# Patient Record
Sex: FEMALE | Race: WHITE | Employment: OTHER | URBAN - NONMETROPOLITAN AREA
[De-identification: names, ages, dates, MRNs, and addresses within clinical notes are randomized per-mention and may not be internally consistent; named-entity substitution may affect disease eponyms.]

---

## 2017-01-18 ENCOUNTER — MED REC SCAN ONLY (OUTPATIENT)
Dept: FAMILY MEDICINE CLINIC | Facility: CLINIC | Age: 37
End: 2017-01-18

## 2017-01-31 ENCOUNTER — PATIENT OUTREACH (OUTPATIENT)
Dept: FAMILY MEDICINE CLINIC | Facility: CLINIC | Age: 37
End: 2017-01-31

## 2017-05-13 RX ORDER — OMEPRAZOLE 40 MG/1
CAPSULE, DELAYED RELEASE ORAL
Qty: 90 CAPSULE | Refills: 0 | Status: SHIPPED | OUTPATIENT
Start: 2017-05-13 | End: 2017-07-22

## 2017-05-13 NOTE — TELEPHONE ENCOUNTER
Last rf 12/31/16 #90x1  Last ov 9/28/16  Last labs 9/30/16 re ck in 3 mo    No future appointments. Letter sent to pt to have fasting labs done.  enoc

## 2017-07-06 RX ORDER — VENLAFAXINE HYDROCHLORIDE 150 MG/1
CAPSULE, EXTENDED RELEASE ORAL
Qty: 360 CAPSULE | Refills: 0 | Status: SHIPPED | OUTPATIENT
Start: 2017-07-06 | End: 2017-12-25

## 2017-07-06 NOTE — TELEPHONE ENCOUNTER
LAST REFILL 10-28-16 #90 W/ 0 REFILLS  LAST OV: 9-28-16      CALLING PATIENT TO VERIFY DOSE  PATIENT TAKES 2 CAPSULES DAILY.      DOES NOT WANT TABLETS

## 2017-07-24 RX ORDER — OMEPRAZOLE 40 MG/1
CAPSULE, DELAYED RELEASE ORAL
Qty: 90 CAPSULE | Refills: 0 | Status: SHIPPED | OUTPATIENT
Start: 2017-07-24 | End: 2018-01-11

## 2017-08-09 ENCOUNTER — LABORATORY ENCOUNTER (OUTPATIENT)
Dept: LAB | Age: 37
End: 2017-08-09
Attending: OTOLARYNGOLOGY
Payer: COMMERCIAL

## 2017-08-09 DIAGNOSIS — Z76.89 ENCOUNTER FOR BIOPSY: Primary | ICD-10-CM

## 2017-08-09 PROCEDURE — 88300 SURGICAL PATH GROSS: CPT

## 2017-10-24 RX ORDER — OMEPRAZOLE 40 MG/1
CAPSULE, DELAYED RELEASE ORAL
Qty: 90 CAPSULE | Refills: 0 | Status: SHIPPED | OUTPATIENT
Start: 2017-10-24 | End: 2018-01-11

## 2017-12-25 DIAGNOSIS — Z00.00 ROUTINE HEALTH MAINTENANCE: Primary | ICD-10-CM

## 2017-12-26 NOTE — TELEPHONE ENCOUNTER
Last OV 9/28/16, last refill 7/6/17. No future appointments pending.  My chart note sent to patient that she is due for an annual wellness exam.

## 2017-12-26 NOTE — TELEPHONE ENCOUNTER
Appointment scheduled for January 3rd for a physical, will you fill the medication? Patient asked if she can do blood work ahead of time, if so what does she need?

## 2017-12-28 RX ORDER — VENLAFAXINE HYDROCHLORIDE 150 MG/1
CAPSULE, EXTENDED RELEASE ORAL
Qty: 360 CAPSULE | Refills: 0 | Status: SHIPPED | OUTPATIENT
Start: 2017-12-28 | End: 2018-06-22

## 2018-01-09 ENCOUNTER — LABORATORY ENCOUNTER (OUTPATIENT)
Dept: LAB | Age: 38
End: 2018-01-09
Attending: INTERNAL MEDICINE
Payer: COMMERCIAL

## 2018-01-09 DIAGNOSIS — Z00.00 ROUTINE HEALTH MAINTENANCE: ICD-10-CM

## 2018-01-09 LAB
ALBUMIN SERPL-MCNC: 3.7 G/DL (ref 3.5–4.8)
ALP LIVER SERPL-CCNC: 99 U/L (ref 37–98)
ALT SERPL-CCNC: 19 U/L (ref 14–54)
AST SERPL-CCNC: 15 U/L (ref 15–41)
BASOPHILS # BLD AUTO: 0.06 X10(3) UL (ref 0–0.1)
BASOPHILS NFR BLD AUTO: 0.6 %
BILIRUB SERPL-MCNC: 0.3 MG/DL (ref 0.1–2)
BUN BLD-MCNC: 19 MG/DL (ref 8–20)
CALCIUM BLD-MCNC: 8.7 MG/DL (ref 8.3–10.3)
CHLORIDE: 104 MMOL/L (ref 101–111)
CHOLEST SMN-MCNC: 222 MG/DL (ref ?–200)
CO2: 27 MMOL/L (ref 22–32)
CREAT BLD-MCNC: 0.86 MG/DL (ref 0.55–1.02)
EOSINOPHIL # BLD AUTO: 0.74 X10(3) UL (ref 0–0.3)
EOSINOPHIL NFR BLD AUTO: 7.5 %
ERYTHROCYTE [DISTWIDTH] IN BLOOD BY AUTOMATED COUNT: 14.3 % (ref 11.5–16)
GLUCOSE BLD-MCNC: 75 MG/DL (ref 70–99)
HCT VFR BLD AUTO: 40.5 % (ref 34–50)
HDLC SERPL-MCNC: 30 MG/DL (ref 45–?)
HDLC SERPL: 7.4 {RATIO} (ref ?–4.44)
HGB BLD-MCNC: 13 G/DL (ref 12–16)
IMMATURE GRANULOCYTE COUNT: 0.04 X10(3) UL (ref 0–1)
IMMATURE GRANULOCYTE RATIO %: 0.4 %
LDLC SERPL DIRECT ASSAY-MCNC: 134 MG/DL (ref ?–100)
LYMPHOCYTES # BLD AUTO: 2.92 X10(3) UL (ref 0.9–4)
LYMPHOCYTES NFR BLD AUTO: 29.6 %
M PROTEIN MFR SERPL ELPH: 7.6 G/DL (ref 6.1–8.3)
MCH RBC QN AUTO: 28.2 PG (ref 27–33.2)
MCHC RBC AUTO-ENTMCNC: 32.1 G/DL (ref 31–37)
MCV RBC AUTO: 87.9 FL (ref 81–100)
MONOCYTES # BLD AUTO: 0.57 X10(3) UL (ref 0.1–0.6)
MONOCYTES NFR BLD AUTO: 5.8 %
NEUTROPHIL ABS PRELIM: 5.53 X10 (3) UL (ref 1.3–6.7)
NEUTROPHILS # BLD AUTO: 5.53 X10(3) UL (ref 1.3–6.7)
NEUTROPHILS NFR BLD AUTO: 56.1 %
NONHDLC SERPL-MCNC: 192 MG/DL (ref ?–130)
PLATELET # BLD AUTO: 430 10(3)UL (ref 150–450)
POTASSIUM SERPL-SCNC: 4.4 MMOL/L (ref 3.6–5.1)
RBC # BLD AUTO: 4.61 X10(6)UL (ref 3.8–5.1)
RED CELL DISTRIBUTION WIDTH-SD: 45.9 FL (ref 35.1–46.3)
SODIUM SERPL-SCNC: 137 MMOL/L (ref 136–144)
TRIGL SERPL-MCNC: 425 MG/DL (ref ?–150)
WBC # BLD AUTO: 9.9 X10(3) UL (ref 4–13)

## 2018-01-09 PROCEDURE — 80061 LIPID PANEL: CPT | Performed by: INTERNAL MEDICINE

## 2018-01-09 PROCEDURE — 83721 ASSAY OF BLOOD LIPOPROTEIN: CPT | Performed by: INTERNAL MEDICINE

## 2018-01-09 PROCEDURE — 85025 COMPLETE CBC W/AUTO DIFF WBC: CPT | Performed by: INTERNAL MEDICINE

## 2018-01-09 PROCEDURE — 80053 COMPREHEN METABOLIC PANEL: CPT | Performed by: INTERNAL MEDICINE

## 2018-01-09 PROCEDURE — 36415 COLL VENOUS BLD VENIPUNCTURE: CPT | Performed by: INTERNAL MEDICINE

## 2018-01-11 NOTE — PROGRESS NOTES
HPI:   Berenice Payne is a 40year old female who presents for a complete physical exam. Symptoms: denies discharge, itching, burning or dysuria, periods are regular, is S/P TANVI, ovaries preserved. Patient complains of obesity.   She has been overwei per week.   Diet: doesn't watch     REVIEW OF SYSTEMS:   GENERAL: feels well otherwise  SKIN: denies any unusual skin lesions  EYES:denies blurred vision or double vision  HEENT: denies nasal congestion, sinus pain or ST  LUNGS: denies shortness of breath w

## 2018-01-29 ENCOUNTER — PATIENT MESSAGE (OUTPATIENT)
Dept: FAMILY MEDICINE CLINIC | Facility: CLINIC | Age: 38
End: 2018-01-29

## 2018-01-29 NOTE — TELEPHONE ENCOUNTER
From: Stephen Urbina  To: Esther Steele MD  Sent: 1/29/2018 3:00 PM CST  Subject: Prescription Question    Hi Dr. Jordan Feldman - I just wanted to give you a two week-melissa update on the vyvanse prescription. So far the mornings have been good.  I haven't don

## 2018-02-08 NOTE — PROGRESS NOTES
Stephen Urbina is a 40year old female. HPI:   Pt has been on Vyvanse for a month for binge eating disorder. She was in [place] the last week so really not paying attention to her diet on vacation. She continues to have trouble around noon.   She distress  SKIN: no rashes,no suspicious lesions  HEENT: atraumatic, normocephalic,ears and throat are clear  NECK: supple,no adenopathy,no bruits  LUNGS: clear to auscultation  CARDIO: RRR without murmur  GI: good BS's,no masses, HSM or tenderness  EXTREMI

## 2018-02-09 PROBLEM — F50.81 BINGE EATING DISORDER: Status: ACTIVE | Noted: 2018-02-09

## 2018-02-09 PROBLEM — F50.819 BINGE EATING DISORDER: Status: ACTIVE | Noted: 2018-02-09

## 2018-03-06 NOTE — PROGRESS NOTES
franklin Mckeon is a 40year old female. HPI:   Still eating too much at night before bed. Working out but no weight loss! I will increase once more.      Current Outpatient Prescriptions:  Lisdexamfetamine Dimesylate 60 MG Oral Cap Take 1 capsule ( clubbing or edema    ASSESSMENT AND PLAN:   Binge eating disorder  (primary encounter diagnosis)  No chest pain or palpitations. She has been able to control her appetite in the AM and noon, but wearing off at night.    No orders of the defined types were

## 2018-03-20 ENCOUNTER — OFFICE VISIT (OUTPATIENT)
Dept: FAMILY MEDICINE CLINIC | Facility: CLINIC | Age: 38
End: 2018-03-20

## 2018-03-20 VITALS
BODY MASS INDEX: 41.54 KG/M2 | SYSTOLIC BLOOD PRESSURE: 130 MMHG | WEIGHT: 231.5 LBS | DIASTOLIC BLOOD PRESSURE: 78 MMHG | OXYGEN SATURATION: 98 % | TEMPERATURE: 99 F | HEIGHT: 62.5 IN | HEART RATE: 111 BPM

## 2018-03-20 DIAGNOSIS — F50.81 BINGE EATING DISORDER: ICD-10-CM
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
## 2018-03-20 PROCEDURE — 99214 OFFICE O/P EST MOD 30 MIN: CPT | Performed by: INTERNAL MEDICINE

## 2018-03-20 RX ORDER — CEPHALEXIN 500 MG/1
500 CAPSULE ORAL 4 TIMES DAILY
Qty: 40 CAPSULE | Refills: 0 | Status: SHIPPED | OUTPATIENT
Start: 2018-03-20 | End: 2018-03-30

## 2018-03-21 NOTE — PROGRESS NOTES
Stephen Urbina is a 40year old female. HPI:   Pt has scratch on the left hand and it is swollen, tender and had purulent discharge. She has no fever or streaking.   She has lost 5 pounds and she has not been exercising  She feels a little racy on °F (37.3 °C) (Temporal)   Ht 62.5\"   Wt 231 lb 8 oz   LMP 03/01/2018 (Approximate)   SpO2 98%   BMI 41.67 kg/m²   GENERAL: well developed, well nourished,in no apparent distress  SKIN: no rashes,no suspicious lesions  HEENT: atraumatic, normocephalic,ears

## 2018-04-02 ENCOUNTER — OFFICE VISIT (OUTPATIENT)
Dept: FAMILY MEDICINE CLINIC | Facility: CLINIC | Age: 38
End: 2018-04-02

## 2018-04-02 VITALS
SYSTOLIC BLOOD PRESSURE: 120 MMHG | HEART RATE: 99 BPM | OXYGEN SATURATION: 98 % | TEMPERATURE: 98 F | WEIGHT: 237.38 LBS | DIASTOLIC BLOOD PRESSURE: 70 MMHG | BODY MASS INDEX: 42.59 KG/M2 | HEIGHT: 62.5 IN

## 2018-04-02 DIAGNOSIS — L84 CALLUS: Primary | ICD-10-CM

## 2018-04-02 DIAGNOSIS — F50.81 BINGE EATING DISORDER: ICD-10-CM

## 2018-04-02 PROCEDURE — 99213 OFFICE O/P EST LOW 20 MIN: CPT | Performed by: INTERNAL MEDICINE

## 2018-04-03 NOTE — PROGRESS NOTES
Reyes Do is a 40year old female. HPI:   Pt has a lump on the left hand in the snuff box where a cat scratched her.   She thought that she might try to take 20 mg bid OF THE VYVANSE BECAUSE SHE IS STILL HAVING TROUBLE around 2-3 pm with unchec diagnosis)  Callus pared, no foreign body and back on 60 mg daily vyvanse. No orders of the defined types were placed in this encounter.       Meds & Refills for this Visit:  Signed Prescriptions Disp Refills    Lisdexamfetamine Dimesylate (VYVANSE) 60 MG

## 2018-04-09 RX ORDER — OMEPRAZOLE 40 MG/1
CAPSULE, DELAYED RELEASE ORAL
Qty: 90 CAPSULE | Refills: 1 | Status: SHIPPED | OUTPATIENT
Start: 2018-04-09 | End: 2018-10-09

## 2018-05-01 NOTE — PROGRESS NOTES
Barron Orozco is a 40year old female. HPI:   Pt has been having great success with concentration, poor for appetite suppression. No deprression, no chest pain and no palpitations. Took a break from working out, but back this week.       Current the defined types were placed in this encounter. Meds & Refills for this Visit:  No prescriptions requested or ordered in this encounter    Imaging & Consults:  None    Follow up as needed.       The patient indicates understanding of these issues and

## 2018-05-14 ENCOUNTER — OFFICE VISIT (OUTPATIENT)
Dept: SURGERY | Facility: CLINIC | Age: 38
End: 2018-05-14

## 2018-05-14 VITALS
BODY MASS INDEX: 42.51 KG/M2 | SYSTOLIC BLOOD PRESSURE: 126 MMHG | HEIGHT: 62 IN | WEIGHT: 231 LBS | RESPIRATION RATE: 18 BRPM | DIASTOLIC BLOOD PRESSURE: 86 MMHG | HEART RATE: 87 BPM | TEMPERATURE: 98 F

## 2018-05-14 DIAGNOSIS — K21.9 GASTROESOPHAGEAL REFLUX DISEASE, ESOPHAGITIS PRESENCE NOT SPECIFIED: Primary | ICD-10-CM

## 2018-05-14 DIAGNOSIS — K44.9 HIATAL HERNIA: ICD-10-CM

## 2018-05-14 DIAGNOSIS — K21.9 GASTROESOPHAGEAL REFLUX DISEASE WITHOUT ESOPHAGITIS: ICD-10-CM

## 2018-05-14 PROCEDURE — 99244 OFF/OP CNSLTJ NEW/EST MOD 40: CPT | Performed by: COLON & RECTAL SURGERY

## 2018-05-14 NOTE — H&P
New Patient Visit Note       Active Problems      1. Gastroesophageal reflux disease, esophagitis presence not specified    2. Gastroesophageal reflux disease without esophagitis    3.  Hiatal hernia        Chief Complaint   Gastroesophageal reflux disease her bed at night. She should absolutely continue on a regular basis her omeprazole. She should not skip any days.     If she is going to violate any of the above listed directions, she should take Tums, 4 tablets, in advance of any known discretions, or Left Groin   []  Prior Incision     Has the patient had the following tests (If yes, please provide an approximate date):  Test Name Yes/No Date   Colonoscopy no    Barium Enema no    CT Abdomen no        Allergies  Dominga Garcia is allergic to allegra-d.     Pas MOUTH EVERY DAY Disp: 90 capsule Rfl: 1   Lisdexamfetamine Dimesylate (VYVANSE) 60 MG Oral Cap Take 1 capsule (60 mg total) by mouth every morning. Disp: 30 capsule Rfl: 0   VENLAFAXINE HCL  MG Oral Capsule SR 24 Hr TAKE 2 CAPSULES BY MOUTH DAILY.  Chantal Pineda and breath sounds normal. No accessory muscle usage. No tachypnea. No respiratory distress. She has no decreased breath sounds. She has no wheezes. She has no rhonchi. She has no rales. She exhibits no mass. Abdominal: Soft.  Normal appearance, normal aor This patient has severe gastroesophageal reflux symptoms. The patient states that she was diagnosed in the past with a hiatal hernia. She had an esophagram done at [facility] on June 17, 2015. It confirms a small hiatal hernia.   It also confirms very slight tilt to her bed at night. She should absolutely continue on a regular basis her omeprazole. She should not skip any days.     If she is going to violate any of the above listed directions, she should take Tums, 4 tablets, in advance of any k

## 2018-05-15 PROBLEM — K44.9 HIATAL HERNIA: Status: ACTIVE | Noted: 2018-05-15

## 2018-05-15 PROBLEM — K21.9 GASTROESOPHAGEAL REFLUX DISEASE WITHOUT ESOPHAGITIS: Status: ACTIVE | Noted: 2018-05-15

## 2018-05-16 NOTE — PATIENT INSTRUCTIONS
I am seeing this patient from consultation by the otolaryngology service, and the primary care service. This patient has severe gastroesophageal reflux symptoms. The patient states that she was diagnosed in the past with a hiatal hernia.   She had an es foods.  She should also elevate the head of her bed on blocks or bricks underneath the bedpost to give a very slight tilt to her bed at night. She should absolutely continue on a regular basis her omeprazole. She should not skip any days.     If she is

## 2018-05-18 ENCOUNTER — HOSPITAL ENCOUNTER (OUTPATIENT)
Dept: GENERAL RADIOLOGY | Facility: HOSPITAL | Age: 38
Discharge: HOME | End: 2018-05-18
Attending: COLON & RECTAL SURGERY
Payer: COMMERCIAL

## 2018-05-18 DIAGNOSIS — K21.9 GASTROESOPHAGEAL REFLUX DISEASE, ESOPHAGITIS PRESENCE NOT SPECIFIED: ICD-10-CM

## 2018-05-18 PROCEDURE — 74246 X-RAY XM UPR GI TRC 2CNTRST: CPT | Performed by: COLON & RECTAL SURGERY

## 2018-06-04 ENCOUNTER — OFFICE VISIT (OUTPATIENT)
Dept: SURGERY | Facility: CLINIC | Age: 38
End: 2018-06-04

## 2018-06-04 VITALS
HEIGHT: 62 IN | WEIGHT: 225 LBS | TEMPERATURE: 99 F | BODY MASS INDEX: 41.41 KG/M2 | HEART RATE: 94 BPM | DIASTOLIC BLOOD PRESSURE: 84 MMHG | SYSTOLIC BLOOD PRESSURE: 152 MMHG

## 2018-06-04 DIAGNOSIS — K21.9 GASTROESOPHAGEAL REFLUX DISEASE WITHOUT ESOPHAGITIS: Primary | ICD-10-CM

## 2018-06-04 DIAGNOSIS — K44.9 HIATAL HERNIA: ICD-10-CM

## 2018-06-04 DIAGNOSIS — F50.81 BINGE EATING DISORDER: ICD-10-CM

## 2018-06-04 PROCEDURE — 99213 OFFICE O/P EST LOW 20 MIN: CPT | Performed by: COLON & RECTAL SURGERY

## 2018-06-04 NOTE — PROGRESS NOTES
Follow Up Visit Note       Active Problems      1. Gastroesophageal reflux disease without esophagitis    2. Hiatal hernia    3.  Binge eating disorder          Chief Complaint   Patient presents with:  Gastro-esophageal Reflux: continued care of Gastric re patient was 30 minutes. Greater than half of our visit was spent in counseling the patient on the above listed diagnoses and treatment options.           PROCEDURE:  XR UGI W/ESOPHOGRAM [facility]) (CPT=74246,09746)     TECHNIQUE:  An air contrast upper g Onset   • Diabetes Father    • Diabetes Sister    • Cancer Other      Social History    Marital status: Single              Spouse name:                       Years of education:                 Number of children:               Social History Main Topics Genitourinary: Negative for difficulty urinating, dysuria, frequency and urgency. Musculoskeletal: Negative for arthralgias and myalgias. Skin: Negative for color change and rash. Neurological: Negative for tremors, syncope and weakness.    Hematolo thinners. She has no significant constipation or diarrhea. She has no significant systemic symptoms.      We have obtained a more recent upper GI and esophagram.  This will determine whether or not she has had progressive advancement of the hiatal fermin

## 2018-06-06 NOTE — PATIENT INSTRUCTIONS
I am seeing this patient in consultation for a hiatal hernia, as well as for gastroesophageal reflux. I evaluated her in the past.  Her presenting history as listed below: This patient has severe gastroesophageal reflux symptoms.      The patient stat name and number. She should definitely mention to him the binge eating disorder and to see if this has any role in his decision-making whether or not to proceed with an endoscopic procedure.     I will await to hear back from him regarding any recommendati

## 2018-06-22 RX ORDER — VENLAFAXINE HYDROCHLORIDE 150 MG/1
CAPSULE, EXTENDED RELEASE ORAL
Qty: 360 CAPSULE | Refills: 0 | Status: SHIPPED | OUTPATIENT
Start: 2018-06-22 | End: 2018-11-19

## 2018-07-26 NOTE — PROGRESS NOTES
Cata Biggs is a 40year old female. HPI:   Pt has been well, she has not lost weight, but she just hired a nutritional .      Current Outpatient Prescriptions:  Lisdexamfetamine Dimesylate (VYVANSE) 60 MG Oral Cap Take 1 capsule (60 mg tota tenderness  EXTREMITIES: no cyanosis, clubbing or edema    ASSESSMENT AND PLAN:   Binge eating disorder  (primary encounter diagnosis)  Gastroesophageal reflux disease without esophagitis  Hiatal hernia  Considering TIPS for GERD and hernia filled PPI, isabelle

## 2018-09-20 ENCOUNTER — TELEPHONE (OUTPATIENT)
Dept: FAMILY MEDICINE CLINIC | Facility: CLINIC | Age: 38
End: 2018-09-20

## 2018-09-20 NOTE — TELEPHONE ENCOUNTER
She said she does have a script and she wants to know if she can get it early tomorrow or Saturday at [facility].

## 2018-09-20 NOTE — TELEPHONE ENCOUNTER
Jared Dumont at [facility] advised patient would like to fill the script early on Saturday 9/22/18 and Dr Rico Youngblood is authorizing this early refill. Jared Dumont states that this is not a problem she can fill Saturday.

## 2018-09-20 NOTE — TELEPHONE ENCOUNTER
Lisdexamfetamine Dimesylate (VYVANSE) 60 MG Oral Cap     PT WILL  TOMORROW. DUE TO FARMING AND HARVESTING, PT IS REQUESTING TO GET HER SCRIPT EARLY.

## 2018-09-24 ENCOUNTER — PATIENT MESSAGE (OUTPATIENT)
Dept: FAMILY MEDICINE CLINIC | Facility: CLINIC | Age: 38
End: 2018-09-24

## 2018-09-24 NOTE — TELEPHONE ENCOUNTER
From: Dayton Later  To: Sridevi Bowers MD  Sent: 9/24/2018 11:07 AM CDT  Subject: Non-Urgent Medical Question    Hi Dr. Trinh Denise!      I've developed a rash where the dial of my apple watch sits on my wrist. I switched arms and same thing happened the

## 2018-10-09 RX ORDER — OMEPRAZOLE 40 MG/1
CAPSULE, DELAYED RELEASE ORAL
Qty: 90 CAPSULE | Refills: 0 | Status: SHIPPED | OUTPATIENT
Start: 2018-10-09 | End: 2019-04-15

## 2018-10-15 ENCOUNTER — TELEPHONE (OUTPATIENT)
Dept: FAMILY MEDICINE CLINIC | Facility: CLINIC | Age: 38
End: 2018-10-15

## 2018-10-15 NOTE — TELEPHONE ENCOUNTER
Last Ov 7/26/18, last refill 7/26/18 for 3 months. Dr Jyoti Snell had written another script on 9/20/18 but the patient did not need it so it was destroyed. She last picked up a script for Vyvanse on 9/22/18 from [place].

## 2018-10-23 ENCOUNTER — OFFICE VISIT (OUTPATIENT)
Dept: FAMILY MEDICINE CLINIC | Facility: CLINIC | Age: 38
End: 2018-10-23
Payer: COMMERCIAL

## 2018-10-23 VITALS
WEIGHT: 244 LBS | HEIGHT: 62 IN | DIASTOLIC BLOOD PRESSURE: 60 MMHG | BODY MASS INDEX: 44.9 KG/M2 | OXYGEN SATURATION: 97 % | HEART RATE: 90 BPM | SYSTOLIC BLOOD PRESSURE: 120 MMHG | TEMPERATURE: 98 F

## 2018-10-23 DIAGNOSIS — M75.41 IMPINGEMENT SYNDROME OF RIGHT SHOULDER: Primary | ICD-10-CM

## 2018-10-23 PROCEDURE — 99213 OFFICE O/P EST LOW 20 MIN: CPT | Performed by: FAMILY MEDICINE

## 2018-10-23 RX ORDER — TRAMADOL HYDROCHLORIDE 50 MG/1
50 TABLET ORAL NIGHTLY PRN
Qty: 10 TABLET | Refills: 0 | Status: SHIPPED | OUTPATIENT
Start: 2018-10-23 | End: 2018-11-19

## 2018-10-23 NOTE — PATIENT INSTRUCTIONS
Patient referred for physical therapy  Discussed proper posture and scapular stabilization  Avoid overhead activities  May use ibuprofen up to 800 mg 3 times daily, take with food, avoid taking immediately before bedtime, allow at least 2-3 hours before la

## 2018-10-23 NOTE — PROGRESS NOTES
HPI:    Patient ID: Cata Biggs is a 41year old female. Patient presents with:  Arm Pain: started one month ago. . right arm. .     C/o right shoulder pain x 1 month, getting worse  No known injury, pt had been doing lots of wt lifitng  otc nsa and 2+ on the left side. Bicep reflexes are 2+ on the right side and 2+ on the left side. Brachioradialis reflexes are 2+ on the right side and 2+ on the left side. Skin: Skin is warm and dry. Psychiatric: She has a normal mood and affect.

## 2018-10-29 RX ORDER — OMEPRAZOLE 40 MG/1
CAPSULE, DELAYED RELEASE ORAL
Qty: 90 CAPSULE | Refills: 0 | OUTPATIENT
Start: 2018-10-29

## 2018-10-31 ENCOUNTER — APPOINTMENT (OUTPATIENT)
Dept: PHYSICAL THERAPY | Age: 38
End: 2018-10-31
Attending: FAMILY MEDICINE
Payer: COMMERCIAL

## 2018-11-05 ENCOUNTER — OFFICE VISIT (OUTPATIENT)
Dept: PHYSICAL THERAPY | Age: 38
End: 2018-11-05
Attending: FAMILY MEDICINE
Payer: COMMERCIAL

## 2018-11-05 DIAGNOSIS — M75.41 IMPINGEMENT SYNDROME OF RIGHT SHOULDER: ICD-10-CM

## 2018-11-05 PROCEDURE — 97110 THERAPEUTIC EXERCISES: CPT

## 2018-11-05 PROCEDURE — 97161 PT EVAL LOW COMPLEX 20 MIN: CPT

## 2018-11-05 NOTE — PROGRESS NOTES
UPPER EXTREMITY EVALUATION:   Referring Physician: Dr. Kerry Gamble  Diagnosis: R shoulder impingement     Date of Service: 11/5/2018     PATIENT SUMMARY   Selam Alexandre is a 41year old y/o female who presents to therapy today with complaints of R sh 5/5  IR: R 5/5; L 5/5 Rhomboids: R4/5, L 4/5  Mid trap: R 4/5; L 4/5   Lats: R 5/5, L 5/5  Low trap: R 4/5; L 4/5     Special tests:   Moses-Brendan Impingement Sign: R +, L -  Painful Arc Sign: R +, L -  Drop Arm Sign: R -, L -  Dropping Sign at 90 Deg include: Manual Therapy; Therapeutic Exercises;  Neuromuscular Re-education; Pt education; Home exercise program instruction    Education or treatment limitation: None  Rehab Potential:good    FOTO: 53/100        Patient/Family/Caregiver was advised of thes

## 2018-11-09 ENCOUNTER — OFFICE VISIT (OUTPATIENT)
Dept: PHYSICAL THERAPY | Age: 38
End: 2018-11-09
Attending: FAMILY MEDICINE
Payer: COMMERCIAL

## 2018-11-09 DIAGNOSIS — M75.41 IMPINGEMENT SYNDROME OF RIGHT SHOULDER: ICD-10-CM

## 2018-11-09 PROCEDURE — 97110 THERAPEUTIC EXERCISES: CPT

## 2018-11-09 PROCEDURE — 97140 MANUAL THERAPY 1/> REGIONS: CPT

## 2018-11-12 ENCOUNTER — TELEPHONE (OUTPATIENT)
Dept: PHYSICAL THERAPY | Age: 38
End: 2018-11-12

## 2018-11-12 ENCOUNTER — APPOINTMENT (OUTPATIENT)
Dept: PHYSICAL THERAPY | Age: 38
End: 2018-11-12
Attending: FAMILY MEDICINE
Payer: COMMERCIAL

## 2018-11-15 ENCOUNTER — OFFICE VISIT (OUTPATIENT)
Dept: PHYSICAL THERAPY | Age: 38
End: 2018-11-15
Attending: INTERNAL MEDICINE
Payer: COMMERCIAL

## 2018-11-15 PROCEDURE — 97110 THERAPEUTIC EXERCISES: CPT

## 2018-11-15 PROCEDURE — 97140 MANUAL THERAPY 1/> REGIONS: CPT

## 2018-11-15 NOTE — PROGRESS NOTES
Dx: R shoulder impingement         Authorized # of Visits:  8         Next MD visit: none scheduled  Fall Risk: standard         Precautions: n/a             Subjective: Shoulder is feeling pretty good, seems like it is moving better.     Objective:   AROM: pec-3'  Post capsule 10\"x10        Side ER 2# 3x10 Side ER 2# 3x10        Prone  Row 3# 3x10  Hor abd 1# 3x10  Ext 2# 3x10 Prone  Row 3# 3x10  Hor abd 1# 3x10  Ext 2# 3x10        tband  ER red 3x10  IR grn 3x10 tband  ER red 3x10  IR grn 3x10  Row grn 3

## 2018-11-16 ENCOUNTER — OFFICE VISIT (OUTPATIENT)
Dept: PHYSICAL THERAPY | Age: 38
End: 2018-11-16
Attending: FAMILY MEDICINE
Payer: COMMERCIAL

## 2018-11-16 DIAGNOSIS — M75.41 IMPINGEMENT SYNDROME OF RIGHT SHOULDER: ICD-10-CM

## 2018-11-16 PROCEDURE — 97140 MANUAL THERAPY 1/> REGIONS: CPT

## 2018-11-16 PROCEDURE — 97110 THERAPEUTIC EXERCISES: CPT

## 2018-11-19 ENCOUNTER — OFFICE VISIT (OUTPATIENT)
Dept: PHYSICAL THERAPY | Age: 38
End: 2018-11-19
Attending: FAMILY MEDICINE
Payer: COMMERCIAL

## 2018-11-19 DIAGNOSIS — M75.41 IMPINGEMENT SYNDROME OF RIGHT SHOULDER: ICD-10-CM

## 2018-11-19 PROCEDURE — 97110 THERAPEUTIC EXERCISES: CPT

## 2018-11-19 PROCEDURE — 97140 MANUAL THERAPY 1/> REGIONS: CPT

## 2018-11-19 NOTE — PROGRESS NOTES
Dx: R shoulder impingement         Authorized # of Visits:  8         Next MD visit: none scheduled  Fall Risk: standard         Precautions: n/a             Subjective:Muscles soreness, DOMS. Shoulder moving better.     Objective:   AROM:   Shoulder    Fle inf/post Joint mobilization-5' grade 3 inf/post Joint mobilization-5' grade 3 inf/post Joint mobilization-5' grade 3 inf/post      Stretching   pec-3'  Post capsule 10\"x10 Stretching   pec-3'  Post capsule 10\"x10 Stretching   pec-3'  Post capsule 10\"x10

## 2018-11-19 NOTE — PROGRESS NOTES
Moreno Oneal is a 41year old female. HPI:   Pt has been well. Working out and watching intake. She has no chest pain or palpitations.      Current Outpatient Medications:  Venlafaxine HCl  MG Oral Capsule SR 24 Hr Take 2 capsules (300 mg to lesions  HEENT: atraumatic, normocephalic,ears and throat are clear  NECK: supple,no adenopathy,no bruits  LUNGS: clear to auscultation  CARDIO: RRR without murmur  GI: good BS's,no masses, HSM or tenderness  EXTREMITIES: no cyanosis, clubbing or edema

## 2018-11-21 ENCOUNTER — APPOINTMENT (OUTPATIENT)
Dept: PHYSICAL THERAPY | Age: 38
End: 2018-11-21
Attending: FAMILY MEDICINE
Payer: COMMERCIAL

## 2018-11-27 ENCOUNTER — APPOINTMENT (OUTPATIENT)
Dept: PHYSICAL THERAPY | Age: 38
End: 2018-11-27
Attending: FAMILY MEDICINE
Payer: COMMERCIAL

## 2018-12-12 ENCOUNTER — PATIENT MESSAGE (OUTPATIENT)
Dept: FAMILY MEDICINE CLINIC | Facility: CLINIC | Age: 38
End: 2018-12-12

## 2018-12-12 NOTE — TELEPHONE ENCOUNTER
From: Moreno Oneal  To: Kinjal Roberts MD  Sent: 12/12/2018 1:29 PM CST  Subject: Prescription Question    Hi Dr. Michael Reed. I am a bit of an idiot and managed to accidentally recycle my Vyvanse prescription for this month and next month.  I placed the

## 2018-12-12 NOTE — TELEPHONE ENCOUNTER
Last filled 11-19-18 according to med reconciliation. New Rx's pended (with dates as previously written).

## 2018-12-17 ENCOUNTER — PATIENT MESSAGE (OUTPATIENT)
Dept: FAMILY MEDICINE CLINIC | Facility: CLINIC | Age: 38
End: 2018-12-17

## 2018-12-17 NOTE — TELEPHONE ENCOUNTER
From: Cherri Rucker  To: Michael Magallanes MD  Sent: 12/17/2018 1:14 PM CST  Subject: Prescription Question    Hi dr sanchez - question regarding the vyvanse prescription for the 19th of this month - would it be possible to get it filled tomorrow, the 1

## 2018-12-18 ENCOUNTER — TELEPHONE (OUTPATIENT)
Dept: FAMILY MEDICINE CLINIC | Facility: CLINIC | Age: 38
End: 2018-12-18

## 2019-02-02 ENCOUNTER — OFFICE VISIT (OUTPATIENT)
Dept: FAMILY MEDICINE CLINIC | Facility: CLINIC | Age: 39
End: 2019-02-02
Payer: COMMERCIAL

## 2019-02-02 VITALS
OXYGEN SATURATION: 98 % | TEMPERATURE: 98 F | HEART RATE: 100 BPM | RESPIRATION RATE: 18 BRPM | SYSTOLIC BLOOD PRESSURE: 120 MMHG | DIASTOLIC BLOOD PRESSURE: 68 MMHG

## 2019-02-02 DIAGNOSIS — J40 BRONCHITIS: Primary | ICD-10-CM

## 2019-02-02 PROCEDURE — 99213 OFFICE O/P EST LOW 20 MIN: CPT | Performed by: NURSE PRACTITIONER

## 2019-02-02 RX ORDER — PREDNISONE 20 MG/1
20 TABLET ORAL DAILY
Qty: 5 TABLET | Refills: 0 | Status: SHIPPED | OUTPATIENT
Start: 2019-02-02 | End: 2019-02-07

## 2019-02-02 RX ORDER — ALBUTEROL SULFATE 90 UG/1
2 AEROSOL, METERED RESPIRATORY (INHALATION) EVERY 4 HOURS PRN
Qty: 1 INHALER | Refills: 1 | Status: SHIPPED | OUTPATIENT
Start: 2019-02-02 | End: 2019-05-06 | Stop reason: ALTCHOICE

## 2019-02-02 NOTE — PATIENT INSTRUCTIONS
What Is Acute Bronchitis? Acute bronchitis is when the airways in your lungs (bronchial tubes) become red and swollen (inflamed). It is usually caused by a viral infection. But it can also occur because of a bacteria or allergen.  Symptoms include a coug · A chest X-ray. This is done if your healthcare provider thinks you have pneumonia. · Tests to check for an underlying condition. Other tests may be done to check for things such as allergies, asthma, or COPD (chronic obstructive pulmonary disease).  You · Take the medicines as directed. For instance, some medicines should be taken with food. · Ask about side effects. Ask your provider or pharmacist what side effects are common, and what to do about them.   Follow-up care  You should see your provider danisha

## 2019-02-02 NOTE — PROGRESS NOTES
CHIEF COMPLAINT:   Patient presents with:  Cough: congestion, sore throat, HA x 4 days         HPI:   Edwin Valadez is a 41year old female who presents for cough for  4  days. Cough started gradually and is described as constant.  Patient denies h /68 (BP Location: Left arm, Patient Position: Sitting, Cuff Size: adult)   Pulse 100   Temp 98.4 °F (36.9 °C) (Oral)   Resp 18   LMP 01/15/2019   SpO2 98%   GENERAL: well developed, well nourished,in no apparent distress  SKIN: no rashes, no suspicio Acute bronchitis is when the airways in your lungs (bronchial tubes) become red and swollen (inflamed). It is usually caused by a viral infection. But it can also occur because of a bacteria or allergen.  Symptoms include a cough that produces yellow or gre · A chest X-ray. This is done if your healthcare provider thinks you have pneumonia. · Tests to check for an underlying condition. Other tests may be done to check for things such as allergies, asthma, or COPD (chronic obstructive pulmonary disease).  You · Take the medicines as directed. For instance, some medicines should be taken with food. · Ask about side effects. Ask your provider or pharmacist what side effects are common, and what to do about them.   Follow-up care  You should see your provider danisha

## 2019-02-14 NOTE — PROGRESS NOTES
Barron Orozco is a 41year old female. HPI:   Pt has been eating due to loss of a 24 y/o family friend. He succeeded in committing suicide. She stopped her medications for a short time and has not been exercising this winter.   I think counseling shortness of breath with exertion  CARDIOVASCULAR: denies chest pain on exertion  GI: denies abdominal pain and denies heartburn  NEURO: denies headaches    EXAM:   /74 (BP Location: Left arm, Patient Position: Sitting, Cuff Size: large)   Pulse 111

## 2019-03-14 ENCOUNTER — TELEPHONE (OUTPATIENT)
Dept: FAMILY MEDICINE CLINIC | Facility: CLINIC | Age: 39
End: 2019-03-14

## 2019-03-14 NOTE — TELEPHONE ENCOUNTER
Lisdexamfetamine Dimesylate (VYVANSE) 50 MG Oral Cap   Pt. Asking if Dr. Dania Conklin will call the pharmacy [facility] so she can get it filled one day early. Pt. Has the script.

## 2019-03-15 ENCOUNTER — TELEPHONE (OUTPATIENT)
Dept: FAMILY MEDICINE CLINIC | Facility: CLINIC | Age: 39
End: 2019-03-15

## 2019-03-15 NOTE — TELEPHONE ENCOUNTER
returned phone call to pharmacy spoke to Sonu Delaney, he needed to know if okay to fill script 1 day early. Per telephone note dated yesterday TG okayed early refill.

## 2019-04-08 ENCOUNTER — TELEPHONE (OUTPATIENT)
Dept: FAMILY MEDICINE CLINIC | Facility: CLINIC | Age: 39
End: 2019-04-08

## 2019-04-08 NOTE — TELEPHONE ENCOUNTER
ACCIDENTALLY TORE UP SCRIPT FOR VYVANSE, SHE THOUGHT IT WAS CREDIT CARD CHECKS. ASKING IF SHE CAN GET A REPLACEMENT? SHE CAN BRING IN TORN SCRIPT.

## 2019-04-12 ENCOUNTER — PATIENT MESSAGE (OUTPATIENT)
Dept: FAMILY MEDICINE CLINIC | Facility: CLINIC | Age: 39
End: 2019-04-12

## 2019-04-12 NOTE — TELEPHONE ENCOUNTER
From: Gretta Koyanagi  To: Jonathan Guido MD  Sent: 4/12/2019 9:51 AM CDT  Subject: Prescription Question    They told me that I can only fill it on the start date unless they are told otherwise. I use [facility]. Could you call them please?

## 2019-04-12 NOTE — TELEPHONE ENCOUNTER
From: Tamy Tiwari  To: Viviana Patton MD  Sent: 4/12/2019 9:35 AM CDT  Subject: Prescription Question    Hi dr sanchez/Ev -     Would I be able to get my prescription filled two days early and get it tomorrow?  The prescription is still at your o

## 2019-04-15 RX ORDER — OMEPRAZOLE 40 MG/1
CAPSULE, DELAYED RELEASE ORAL
Qty: 90 CAPSULE | Refills: 0 | Status: SHIPPED | OUTPATIENT
Start: 2019-04-15 | End: 2020-01-17

## 2019-05-06 ENCOUNTER — OFFICE VISIT (OUTPATIENT)
Dept: FAMILY MEDICINE CLINIC | Facility: CLINIC | Age: 39
End: 2019-05-06
Payer: COMMERCIAL

## 2019-05-06 VITALS
SYSTOLIC BLOOD PRESSURE: 118 MMHG | BODY MASS INDEX: 45.45 KG/M2 | HEIGHT: 62 IN | TEMPERATURE: 98 F | OXYGEN SATURATION: 99 % | HEART RATE: 97 BPM | DIASTOLIC BLOOD PRESSURE: 70 MMHG | WEIGHT: 247 LBS

## 2019-05-06 DIAGNOSIS — Z00.00 WELL ADULT EXAM: Primary | ICD-10-CM

## 2019-05-06 PROCEDURE — 99395 PREV VISIT EST AGE 18-39: CPT | Performed by: INTERNAL MEDICINE

## 2019-05-06 RX ORDER — VENLAFAXINE HYDROCHLORIDE 150 MG/1
300 CAPSULE, EXTENDED RELEASE ORAL DAILY
Refills: 3 | COMMUNITY
Start: 2019-03-15 | End: 2019-06-03

## 2019-05-06 NOTE — PROGRESS NOTES
HPI:   Eunice Pimentel is a 41year old female who presents for a complete physical exam. Symptoms: denies discharge, itching, burning or dysuria, periods are regular. Patient complains of nothing. She has been working out and recording all foods. Abdominal hernia    • Anxiety    • Depression    • Hiatal hernia    • History of depression    • Indigestion    • Vomiting    • Wears glasses       Past Surgical History:   Procedure Laterality Date   • BREAST BIOPSY Right     bengin   • OTHER SURGICAL HIS or tenderness  : deferred   RECTAL: Deferred  MUSCULOSKELETAL: back is not tender,FROM of the back  EXTREMITIES: no cyanosis, clubbing or edema  NEURO: Oriented times three,cranial nerves are intact,motor and sensory are grossly intact    ASSESSMENT AND

## 2019-05-29 ENCOUNTER — PATIENT MESSAGE (OUTPATIENT)
Dept: FAMILY MEDICINE CLINIC | Facility: CLINIC | Age: 39
End: 2019-05-29

## 2019-05-29 NOTE — TELEPHONE ENCOUNTER
From: Precious Dominguez  To: Taylor Crawford MD  Sent: 5/29/2019 1:44 PM CDT  Subject: Prescription Question    Hi Dr. Rand Klein! I have been having a lot of trouble lately (2 weeks or so) regarding my depression.  It feels as though my medication is no long

## 2019-06-03 NOTE — PROGRESS NOTES
Daja Quiros is a 41year old female. HPI:   Pt has anxiety and low motivation. She feels like she did when she started the Effexor 15 years ago at 25.   This month has been really hard, she had a friend pass away this year, but this is not the p and denies heartburn  NEURO: denies headaches    EXAM:   /80 (BP Location: Right arm, Patient Position: Sitting, Cuff Size: large)   Pulse 91   Temp 97.9 °F (36.6 °C) (Temporal)   Ht 62\"   Wt 251 lb   SpO2 98%   BMI 45.91 kg/m²   GENERAL: well devel

## 2019-06-05 ENCOUNTER — PATIENT OUTREACH (OUTPATIENT)
Dept: FAMILY MEDICINE CLINIC | Facility: CLINIC | Age: 39
End: 2019-06-05

## 2019-06-11 ENCOUNTER — PATIENT MESSAGE (OUTPATIENT)
Dept: FAMILY MEDICINE CLINIC | Facility: CLINIC | Age: 39
End: 2019-06-11

## 2019-06-12 RX ORDER — VENLAFAXINE 75 MG/1
75 TABLET ORAL 2 TIMES DAILY
Qty: 180 TABLET | Refills: 0 | Status: SHIPPED | OUTPATIENT
Start: 2019-06-12 | End: 2019-07-31 | Stop reason: ALTCHOICE

## 2019-06-12 NOTE — TELEPHONE ENCOUNTER
From: Cata Biggs  To: Triny Dykes MD  Sent: 6/11/2019 10:14 PM CDT  Subject: Prescription Question    Hi dr sanchez. Up until today I was doing great. I feel like today the withdrawal took thinks up a notch.  Super dizzy, really nauseous, extrem

## 2019-07-31 NOTE — PROGRESS NOTES
Dede Silverman is a 41year old female. HPI:   Pt has been on ketamine infusions for the last 6 months through [facility], Dr Kyle Monk. She was doing infusions in [facility].  Has helped tremendously with depression, since she has been suff GENERAL: well developed, well nourished,in no apparent distress  SKIN: no rashes,no suspicious lesions  HEENT: atraumatic, normocephalic,ears and throat are clear  NECK: supple,no adenopathy,no bruits  LUNGS: clear to auscultation  CARDIO: RRR without mu

## 2019-08-22 ENCOUNTER — TELEPHONE (OUTPATIENT)
Dept: FAMILY MEDICINE CLINIC | Facility: CLINIC | Age: 39
End: 2019-08-22

## 2019-09-18 ENCOUNTER — PATIENT MESSAGE (OUTPATIENT)
Dept: FAMILY MEDICINE CLINIC | Facility: CLINIC | Age: 39
End: 2019-09-18

## 2019-09-18 NOTE — TELEPHONE ENCOUNTER
From: Alm Rinne  To: Jim Whitt MD  Sent: 9/18/2019 1:38 PM CDT  Subject: Prescription Question    Hi Dr. Etta Waddell! Long story short - my prescription papers got thrown out in a dumpster during my house remodeling.  I am not dumpster diving

## 2019-10-10 ENCOUNTER — PATIENT MESSAGE (OUTPATIENT)
Dept: FAMILY MEDICINE CLINIC | Facility: CLINIC | Age: 39
End: 2019-10-10

## 2019-10-10 NOTE — TELEPHONE ENCOUNTER
From: Dennie Rooks  To: Horacio Leon MD  Sent: 10/10/2019 1:45 PM CDT  Subject: Prescription Question    Hi Dr. Delaney Alvarenga! I hope you're doing well. I would like to switch back to the 60mg Vyvanse.  The 40mg just doesn't seem to have the same effect

## 2019-10-23 ENCOUNTER — LAB ENCOUNTER (OUTPATIENT)
Dept: LAB | Age: 39
End: 2019-10-23
Attending: INTERNAL MEDICINE
Payer: COMMERCIAL

## 2019-10-23 DIAGNOSIS — Z00.00 WELL ADULT EXAM: ICD-10-CM

## 2019-10-23 PROCEDURE — 80061 LIPID PANEL: CPT | Performed by: INTERNAL MEDICINE

## 2019-10-23 PROCEDURE — 85025 COMPLETE CBC W/AUTO DIFF WBC: CPT | Performed by: INTERNAL MEDICINE

## 2019-10-23 PROCEDURE — 80053 COMPREHEN METABOLIC PANEL: CPT | Performed by: INTERNAL MEDICINE

## 2019-10-23 PROCEDURE — 83721 ASSAY OF BLOOD LIPOPROTEIN: CPT | Performed by: INTERNAL MEDICINE

## 2019-10-23 PROCEDURE — 36415 COLL VENOUS BLD VENIPUNCTURE: CPT | Performed by: INTERNAL MEDICINE

## 2019-10-29 NOTE — PROGRESS NOTES
Waleska Jack is a 46year old female. HPI:   Pt thinks her appetite gets ravenous when her vyvanse wears off. She is gaining again and doing ketamine infusions about once a month. She is very satisfied with her sleep and moods seems fine.   She ha adenopathy,no bruits  LUNGS: clear to auscultation  CARDIO: RRR without murmur  GI: good BS's,no masses, HSM or tenderness  EXTREMITIES: no cyanosis, clubbing or edema    ASSESSMENT AND PLAN:     Binge eating disorder  (primary encounter diagnosis)  Adult

## 2019-11-19 RX ORDER — VENLAFAXINE HYDROCHLORIDE 150 MG/1
CAPSULE, EXTENDED RELEASE ORAL
Qty: 180 CAPSULE | Refills: 0 | Status: SHIPPED | OUTPATIENT
Start: 2019-11-19 | End: 2019-12-02 | Stop reason: ALTCHOICE

## 2019-12-02 ENCOUNTER — OFFICE VISIT (OUTPATIENT)
Dept: FAMILY MEDICINE CLINIC | Facility: CLINIC | Age: 39
End: 2019-12-02
Payer: COMMERCIAL

## 2019-12-02 ENCOUNTER — TELEPHONE (OUTPATIENT)
Dept: FAMILY MEDICINE CLINIC | Facility: CLINIC | Age: 39
End: 2019-12-02

## 2019-12-02 VITALS
TEMPERATURE: 99 F | HEIGHT: 62 IN | HEART RATE: 106 BPM | WEIGHT: 275 LBS | DIASTOLIC BLOOD PRESSURE: 80 MMHG | RESPIRATION RATE: 18 BRPM | BODY MASS INDEX: 50.61 KG/M2 | SYSTOLIC BLOOD PRESSURE: 130 MMHG

## 2019-12-02 DIAGNOSIS — J20.9 BRONCHITIS WITH BRONCHOSPASM: Primary | ICD-10-CM

## 2019-12-02 PROCEDURE — 99214 OFFICE O/P EST MOD 30 MIN: CPT | Performed by: INTERNAL MEDICINE

## 2019-12-02 RX ORDER — PREDNISONE 20 MG/1
TABLET ORAL
Qty: 18 TABLET | Refills: 0 | Status: SHIPPED | OUTPATIENT
Start: 2019-12-02 | End: 2020-02-13 | Stop reason: ALTCHOICE

## 2019-12-02 RX ORDER — ALBUTEROL SULFATE 90 UG/1
1 AEROSOL, METERED RESPIRATORY (INHALATION) EVERY 6 HOURS PRN
Qty: 1 INHALER | Refills: 0 | Status: SHIPPED | OUTPATIENT
Start: 2019-12-02 | End: 2020-01-01

## 2019-12-02 RX ORDER — PREDNISONE 20 MG/1
TABLET ORAL
Qty: 18 TABLET | Refills: 0 | Status: SHIPPED | OUTPATIENT
Start: 2019-12-02 | End: 2019-12-02

## 2019-12-02 NOTE — TELEPHONE ENCOUNTER
Form completed and sent to Physician's Office electronically via Nurse Pool for review and signature.     Unable to release form until signed authorization received.     Her med's are not at [facility] pl call them in.

## 2019-12-02 NOTE — PROGRESS NOTES
HPI:   Daja Quiros is a 46year old female who presents for upper respiratory symptoms for  4  days. Patient reports congestion, dry cough, cough is keeping pt up at night, chest pain from coughing, wheezing.     Current Outpatient Medications   M auscultation  CARDIO: RRR without murmur  GI: good BS's,no masses, HSM or tenderness    ASSESSMENT AND PLAN:   Gretta Koyanagi is a 46year old female who presents with Bronchitis with Bronchospasm.  PLAN: OTC decongestants, throat lozenges and tyleno

## 2019-12-30 ENCOUNTER — PATIENT MESSAGE (OUTPATIENT)
Dept: FAMILY MEDICINE CLINIC | Facility: CLINIC | Age: 39
End: 2019-12-30

## 2019-12-30 ENCOUNTER — OFFICE VISIT (OUTPATIENT)
Dept: FAMILY MEDICINE CLINIC | Facility: CLINIC | Age: 39
End: 2019-12-30
Payer: COMMERCIAL

## 2019-12-30 VITALS
WEIGHT: 281 LBS | DIASTOLIC BLOOD PRESSURE: 70 MMHG | BODY MASS INDEX: 51 KG/M2 | SYSTOLIC BLOOD PRESSURE: 120 MMHG | TEMPERATURE: 98 F | RESPIRATION RATE: 16 BRPM | HEART RATE: 94 BPM

## 2019-12-30 DIAGNOSIS — L05.91 PILONIDAL CYST: Primary | ICD-10-CM

## 2019-12-30 PROCEDURE — 99214 OFFICE O/P EST MOD 30 MIN: CPT | Performed by: INTERNAL MEDICINE

## 2019-12-30 RX ORDER — SULFAMETHOXAZOLE AND TRIMETHOPRIM 800; 160 MG/1; MG/1
1 TABLET ORAL EVERY 12 HOURS
COMMUNITY
Start: 2019-12-23 | End: 2019-12-30

## 2019-12-30 NOTE — TELEPHONE ENCOUNTER
From: Karl Lucas  To: Carmen London MD  Sent: 12/30/2019 7:58 AM CST  Subject: Prescription Question    Hi Dr Jessy Arteaga! [facility] be able [facility] and let them fill my vyvanse presecription today?  It's due the 1st, but i am leaving early t

## 2019-12-31 NOTE — PROGRESS NOTES
Cherri Rucker is a 46year old female. HPI:   Pt has been on Keflex after abscess drainage in the ER 12/23/2019 of a pilonidal cyst.  It was packed and she returned 12/24 for removal of packing. She is feeling better, no pain, no drainage.   Here with exertion  CARDIOVASCULAR: denies chest pain on exertion  GI: denies abdominal pain and denies heartburn  NEURO: denies headaches    EXAM:   /70 (BP Location: Left arm, Patient Position: Sitting, Cuff Size: large)   Pulse 94   Temp 98 °F (36.7 °C

## 2020-01-09 ENCOUNTER — TELEPHONE (OUTPATIENT)
Dept: FAMILY MEDICINE CLINIC | Facility: CLINIC | Age: 40
End: 2020-01-09

## 2020-01-09 ENCOUNTER — OFFICE VISIT (OUTPATIENT)
Dept: FAMILY MEDICINE CLINIC | Facility: CLINIC | Age: 40
End: 2020-01-09
Payer: COMMERCIAL

## 2020-01-09 VITALS
SYSTOLIC BLOOD PRESSURE: 124 MMHG | RESPIRATION RATE: 18 BRPM | WEIGHT: 273 LBS | BODY MASS INDEX: 50 KG/M2 | TEMPERATURE: 98 F | HEART RATE: 100 BPM | DIASTOLIC BLOOD PRESSURE: 70 MMHG

## 2020-01-09 DIAGNOSIS — L73.9 FOLLICULITIS: Primary | ICD-10-CM

## 2020-01-09 PROCEDURE — 99214 OFFICE O/P EST MOD 30 MIN: CPT | Performed by: INTERNAL MEDICINE

## 2020-01-09 RX ORDER — AMOXICILLIN 400 MG/5ML
400 POWDER, FOR SUSPENSION ORAL 2 TIMES DAILY
Qty: 100 ML | Refills: 0 | Status: SHIPPED | OUTPATIENT
Start: 2020-01-09 | End: 2020-01-09 | Stop reason: DRUGHIGH

## 2020-01-09 RX ORDER — AMOXICILLIN 500 MG/1
500 CAPSULE ORAL 3 TIMES DAILY
Qty: 30 CAPSULE | Refills: 0 | Status: SHIPPED | OUTPATIENT
Start: 2020-01-09 | End: 2020-01-19

## 2020-01-09 NOTE — PROGRESS NOTES
Corin Rodgers is a 46year old female. HPI:   Pt has   Current Outpatient Medications   Medication Sig Dispense Refill   • Lisdexamfetamine Dimesylate (VYVANSE) 40 MG Oral Cap Take 1 capsule (40 mg total) by mouth daily.  30 capsule 0   • [START ON BS's,no masses, HSM or tenderness  EXTREMITIES: no cyanosis, clubbing or edema    ASSESSMENT AND PLAN:     No diagnosis found. No orders of the defined types were placed in this encounter.       Meds & Refills for this Visit:  Requested Prescriptions

## 2020-01-09 NOTE — TELEPHONE ENCOUNTER
[facility] asked if they should fill the Amox capsules or liquid. Advised capsules. W.THIAGO.  Dr Cristiana Reyes RN

## 2020-01-09 NOTE — PROGRESS NOTES
Cata Biggs is a 46year old female. HPI:   Pt has a rash on the face x 3 days. She was on bactrim for a pilonidal cyst recently and before that in early December steroids. She feels as if this kind of came out of the blue and spread quickly. (36.6 °C) (Temporal)   Resp 18   Wt 273 lb (123.8 kg)   BMI 49.93 kg/m²   GENERAL: well developed, well nourished,in no apparent distress  SKIN: small pustular rash on face  HEENT: atraumatic, normocephalic,ears and throat are clear  NECK: supple,no adenop

## 2020-01-17 RX ORDER — OMEPRAZOLE 40 MG/1
CAPSULE, DELAYED RELEASE ORAL
Qty: 90 CAPSULE | Refills: 0 | Status: SHIPPED | OUTPATIENT
Start: 2020-01-17 | End: 2020-04-20

## 2020-01-29 ENCOUNTER — TELEPHONE (OUTPATIENT)
Dept: FAMILY MEDICINE CLINIC | Facility: CLINIC | Age: 40
End: 2020-01-29

## 2020-02-06 ENCOUNTER — MED REC SCAN ONLY (OUTPATIENT)
Dept: FAMILY MEDICINE CLINIC | Facility: CLINIC | Age: 40
End: 2020-02-06

## 2020-02-13 NOTE — PROGRESS NOTES
Aiyana Urbina is a 46year old female. HPI:   Pt returning to school Monday on line. She is studying accounting. She just returned from [facility]. She is in a good mood. No weight loss, but still trying.     Current Outpatient Medications   Medication masses, HSM or tenderness  EXTREMITIES: no cyanosis, clubbing or edema    ASSESSMENT AND PLAN:     Binge eating disorder  (primary encounter diagnosis)  Adult bmi 45.0-49.9 kg/sq m (hcc)  Increased vyvanse to 60 and recheck in a month, pulse and pressure

## 2020-02-27 ENCOUNTER — OFFICE VISIT (OUTPATIENT)
Dept: FAMILY MEDICINE CLINIC | Facility: CLINIC | Age: 40
End: 2020-02-27
Payer: COMMERCIAL

## 2020-02-27 VITALS
DIASTOLIC BLOOD PRESSURE: 66 MMHG | SYSTOLIC BLOOD PRESSURE: 130 MMHG | TEMPERATURE: 98 F | BODY MASS INDEX: 49 KG/M2 | HEART RATE: 88 BPM | WEIGHT: 270 LBS

## 2020-02-27 DIAGNOSIS — M54.50 ACUTE BILATERAL LOW BACK PAIN WITHOUT SCIATICA: Primary | ICD-10-CM

## 2020-02-27 PROCEDURE — 99214 OFFICE O/P EST MOD 30 MIN: CPT | Performed by: INTERNAL MEDICINE

## 2020-02-27 RX ORDER — CYCLOBENZAPRINE HCL 10 MG
10 TABLET ORAL 3 TIMES DAILY
Qty: 30 TABLET | Refills: 1 | Status: SHIPPED | OUTPATIENT
Start: 2020-02-27 | End: 2020-03-18

## 2020-02-27 NOTE — PROGRESS NOTES
HPI:   Eunice Pimentel is a 46year old female who is here for complaints of back pain. Pain is located at left low back. Pain is described as dull, aching. Severity is moderate, severe. The pain radiates to no radiation of pain.  Pain was precipitat exertion  GI: denies abdominal pain,denies heartburn  MUSCULOSKELETAL: no other joints are affected    EXAM:   /66 (BP Location: Right arm, Patient Position: Sitting, Cuff Size: large)   Pulse 88   Temp 98.2 °F (36.8 °C) (Temporal)   Wt 270 lb (122.5

## 2020-03-06 ENCOUNTER — PATIENT MESSAGE (OUTPATIENT)
Dept: FAMILY MEDICINE CLINIC | Facility: CLINIC | Age: 40
End: 2020-03-06

## 2020-03-07 NOTE — TELEPHONE ENCOUNTER
From: Celesta Hamman  To: Thompson Hopkins MD  Sent: 3/6/2020 5:19 PM CST  Subject: Prescription Question    Hi dr sanchez! I just wanted to update you on two things:     My back is doing a million times better.  It took about a day and a half before the

## 2020-03-09 NOTE — TELEPHONE ENCOUNTER
Last refill: 2/27/20 #42 w/ 3 refills  Last OV: 2/27/20  Last labs: 10/23/19    No future appointments.

## 2020-04-04 ENCOUNTER — PATIENT MESSAGE (OUTPATIENT)
Dept: FAMILY MEDICINE CLINIC | Facility: CLINIC | Age: 40
End: 2020-04-04

## 2020-04-06 NOTE — TELEPHONE ENCOUNTER
From: Selam Alexandre  To: Sohan Silveira MD  Sent: 4/4/2020 3:59 PM CDT  Subject: Prescription Question    Hi dr sanchez! I hope you're doing well and staying safe. Could I get my next Vyvanse prescription sent to [facility] please?  I'm still d

## 2020-04-18 ENCOUNTER — PATIENT MESSAGE (OUTPATIENT)
Dept: FAMILY MEDICINE CLINIC | Facility: CLINIC | Age: 40
End: 2020-04-18

## 2020-04-20 RX ORDER — OMEPRAZOLE 40 MG/1
CAPSULE, DELAYED RELEASE ORAL
Qty: 90 CAPSULE | Refills: 0 | Status: SHIPPED | OUTPATIENT
Start: 2020-04-20 | End: 2020-07-17

## 2020-04-20 NOTE — TELEPHONE ENCOUNTER
From: Stephen Urbina  To: Adrianne Weathers MD  Sent: 4/18/2020 1:19 PM CDT  Subject: Other    Hi dr sanchez! Would you let me know when the covid antibody test is available at your office please? I would like to know if I have had it already.      Thank y

## 2020-05-03 ENCOUNTER — PATIENT MESSAGE (OUTPATIENT)
Dept: FAMILY MEDICINE CLINIC | Facility: CLINIC | Age: 40
End: 2020-05-03

## 2020-05-04 ENCOUNTER — TELEPHONE (OUTPATIENT)
Dept: FAMILY MEDICINE CLINIC | Facility: CLINIC | Age: 40
End: 2020-05-04

## 2020-05-04 NOTE — TELEPHONE ENCOUNTER
From: Corin Rodgers  To: Luisa Castro MD  Sent: 5/3/2020 6:52 PM CDT  Subject: Other    Hi dr sanchez! Would you be able to call [facility] and authorize my refill to be filled a couple days early tomorrow?  I'll probably be in the field Tues

## 2020-05-04 NOTE — PROGRESS NOTES
Virtual Telephone Check-In    Daja Quiros verbally consents to a Virtual/Telephone Check-In visit on 05/04/20.     Patient understands and accepts financial responsibility for any deductible, co-insurance and/or co-pays associated with this servic any unusual skin lesions or rashes  RESPIRATORY: denies shortness of breath with exertion  CARDIOVASCULAR: denies chest pain on exertion  GI: denies abdominal pain and denies heartburn  NEURO: denies headaches    EXAM:   There were no vitals taken for this

## 2020-05-04 NOTE — TELEPHONE ENCOUNTER
Virtual/Telephone Check-In    Woody Weeks verbally {consents to a Virtual/Telephone Check-In service on 05/04/20.   Patient understands and accepts financial responsibility for any deductible, co-insurance and/or co-pays associated with this servi

## 2020-06-22 ENCOUNTER — PATIENT MESSAGE (OUTPATIENT)
Dept: FAMILY MEDICINE CLINIC | Facility: CLINIC | Age: 40
End: 2020-06-22

## 2020-06-22 RX ORDER — VENLAFAXINE HYDROCHLORIDE 150 MG/1
150 CAPSULE, EXTENDED RELEASE ORAL DAILY
Qty: 90 CAPSULE | Refills: 0 | Status: SHIPPED | OUTPATIENT
Start: 2020-06-22 | End: 2021-06-01

## 2020-06-22 NOTE — TELEPHONE ENCOUNTER
From: Dennie Rooks  To: Mak Edwards MD  Sent: 6/22/2020 11:52 AM CDT  Subject: Prescription Question    I will also need a new prescription for the Effexor as well since I now take one 150mg capsule.

## 2020-06-23 ENCOUNTER — PATIENT MESSAGE (OUTPATIENT)
Dept: FAMILY MEDICINE CLINIC | Facility: CLINIC | Age: 40
End: 2020-06-23

## 2020-06-23 PROBLEM — F41.9 ANXIETY: Status: ACTIVE | Noted: 2020-06-23

## 2020-06-23 NOTE — PROGRESS NOTES
This visit was conducted via video with audio and visual components during the COVID 19 pandemic due to shelter at home rules. Pt accepted responsibility for payment and video conducted in [place]. Tamy Tiwari is a 46year old female.   HPI:   Pt has developed, well nourished,in no apparent distress  SKIN: no rashes,no suspicious lesions  HEENT: atraumatic, normocephalic,ears and throat are clear  NECK: supple,no adenopathy,no bruits  LUNGS: clear to auscultation  CARDIO: RRR without murmur  GI: good B

## 2020-06-23 NOTE — TELEPHONE ENCOUNTER
From: Dong Carvalho MD  To: Toya Pallas  Sent: 6/23/2020 10:35 AM CDT  Subject: reguarding meds    I have been thinking about you and I would recommend you STOP vyvanse until anxiety is under control.  There has got to be some reason this anxiety

## 2020-07-01 ENCOUNTER — PATIENT MESSAGE (OUTPATIENT)
Dept: FAMILY MEDICINE CLINIC | Facility: CLINIC | Age: 40
End: 2020-07-01

## 2020-07-01 NOTE — TELEPHONE ENCOUNTER
From: Cata Biggs  To: Rosanna Hightower MD  Sent: 7/1/2020 8:43 AM CDT  Subject: Prescription Question    Hi dr sanchez! I had a great visit with my psychiatrist last week. We added buspirone 7.5mg twice a day for now.  I've taken it sparingly since la

## 2020-07-17 RX ORDER — OMEPRAZOLE 40 MG/1
CAPSULE, DELAYED RELEASE ORAL
Qty: 90 CAPSULE | Refills: 0 | Status: SHIPPED | OUTPATIENT
Start: 2020-07-17 | End: 2020-10-16

## 2020-07-17 NOTE — TELEPHONE ENCOUNTER
Last refill: 04/20/20  QtY: 90  W/ 0 refills  Last ov: 06/22/20    Requested Prescriptions     Pending Prescriptions Disp Refills   • OMEPRAZOLE 40 MG Oral Capsule Delayed Release [Pharmacy Med Name: OMEPRAZOLE 40MG CAPSULES] 90 capsule 0     Sig: TAKE 1 C

## 2020-07-27 ENCOUNTER — PATIENT MESSAGE (OUTPATIENT)
Dept: FAMILY MEDICINE CLINIC | Facility: CLINIC | Age: 40
End: 2020-07-27

## 2020-07-28 ENCOUNTER — PATIENT MESSAGE (OUTPATIENT)
Dept: FAMILY MEDICINE CLINIC | Facility: CLINIC | Age: 40
End: 2020-07-28

## 2020-07-28 NOTE — TELEPHONE ENCOUNTER
From: Waleska Jack  To: Juana Foreman MD  Sent: 7/28/2020 9:45 AM CDT  Subject: Prescription Question    Lol. Yes. V y v a n s e. I don't know why it autocorrects to Cubans.

## 2020-07-28 NOTE — TELEPHONE ENCOUNTER
From: Saba Helms  To: Mervin Mccormick MD  Sent: 7/27/2020 8:48 PM CDT  Subject: Prescription Question    Hi Dr Jazmín Zhu! My Cubans prescription needs to be renewed. Would you like to do an evisit beforehand?  Let me know and I will schedule one if joshua

## 2020-07-28 NOTE — TELEPHONE ENCOUNTER
Last refill #30 on 7/5/2020  Last office visit on 6/22/2020  Future Appointments   Date Time Provider Lloyd Glynn   8/19/2020  3:00 PM LYNNETTE Pacheco OB [facility]

## 2020-08-27 ENCOUNTER — PATIENT MESSAGE (OUTPATIENT)
Dept: FAMILY MEDICINE CLINIC | Facility: CLINIC | Age: 40
End: 2020-08-27

## 2020-08-27 NOTE — TELEPHONE ENCOUNTER
From: Waleska Jack  To: Juana Foreman MD  Sent: 8/27/2020 9:05 AM CDT  Subject: Prescription Question    Hi dr sanchez! Could I get my vyvanse sent to [facility] please? Thank you!

## 2020-09-22 ENCOUNTER — PATIENT MESSAGE (OUTPATIENT)
Dept: FAMILY MEDICINE CLINIC | Facility: CLINIC | Age: 40
End: 2020-09-22

## 2020-09-22 NOTE — TELEPHONE ENCOUNTER
From: Jerman Briggs  To: Dorinda Dominguez MD  Sent: 9/22/2020 11:10 AM CDT  Subject: Prescription Question    Hi dr sanchez! Could I get my vyvanse early? Maybe today or tomorrow?  We are starting corn harvest tomorrow and the weather has zero rain for t

## 2020-10-06 PROBLEM — G47.33 OSA (OBSTRUCTIVE SLEEP APNEA): Status: ACTIVE | Noted: 2020-10-06

## 2020-10-14 ENCOUNTER — LAB ENCOUNTER (OUTPATIENT)
Dept: LAB | Age: 40
End: 2020-10-14
Attending: INTERNAL MEDICINE
Payer: COMMERCIAL

## 2020-10-14 DIAGNOSIS — E66.01 MORBID OBESITY WITH BMI OF 45.0-49.9, ADULT (HCC): ICD-10-CM

## 2020-10-14 DIAGNOSIS — Z51.81 ENCOUNTER FOR THERAPEUTIC DRUG MONITORING: ICD-10-CM

## 2020-10-14 PROCEDURE — 80048 BASIC METABOLIC PNL TOTAL CA: CPT

## 2020-10-14 PROCEDURE — 82397 CHEMILUMINESCENT ASSAY: CPT

## 2020-10-14 PROCEDURE — 36415 COLL VENOUS BLD VENIPUNCTURE: CPT

## 2020-10-14 PROCEDURE — 83036 HEMOGLOBIN GLYCOSYLATED A1C: CPT

## 2020-10-14 PROCEDURE — 80061 LIPID PANEL: CPT

## 2020-10-14 PROCEDURE — 82607 VITAMIN B-12: CPT

## 2020-10-14 PROCEDURE — 82306 VITAMIN D 25 HYDROXY: CPT

## 2020-10-14 PROCEDURE — 86141 C-REACTIVE PROTEIN HS: CPT

## 2020-10-14 PROCEDURE — 84443 ASSAY THYROID STIM HORMONE: CPT

## 2020-10-16 RX ORDER — OMEPRAZOLE 40 MG/1
CAPSULE, DELAYED RELEASE ORAL
Qty: 90 CAPSULE | Refills: 0 | Status: SHIPPED | OUTPATIENT
Start: 2020-10-16 | End: 2021-01-11

## 2020-10-16 NOTE — TELEPHONE ENCOUNTER
Last refill #90 on 7/17/2020  Last office visit on 6/22/2020 -  Virtual  Future Appointments   Date Time Provider Lloyd Glynn   11/12/2020  1:00 PM LYNNETTE Moran [facility] [place]   12/10/2020  1:00 PM LYNNETTE Moran OB  [facility]

## 2021-01-11 RX ORDER — OMEPRAZOLE 40 MG/1
40 CAPSULE, DELAYED RELEASE ORAL DAILY
Qty: 90 CAPSULE | Refills: 0 | Status: SHIPPED | OUTPATIENT
Start: 2021-01-11 | End: 2021-04-15

## 2021-01-11 NOTE — TELEPHONE ENCOUNTER
Last OV w/Dr Elliot Holt 6/22/20-televisit-anxiety   Last refill 10/16/20 #90  No future appointments scheduled.

## 2021-04-08 ENCOUNTER — OFFICE VISIT (OUTPATIENT)
Dept: FAMILY MEDICINE CLINIC | Facility: CLINIC | Age: 41
End: 2021-04-08
Payer: COMMERCIAL

## 2021-04-08 ENCOUNTER — HOSPITAL ENCOUNTER (OUTPATIENT)
Dept: GENERAL RADIOLOGY | Age: 41
Discharge: HOME | End: 2021-04-08
Attending: INTERNAL MEDICINE
Payer: COMMERCIAL

## 2021-04-08 VITALS
BODY MASS INDEX: 46.38 KG/M2 | SYSTOLIC BLOOD PRESSURE: 132 MMHG | WEIGHT: 252 LBS | HEIGHT: 62 IN | TEMPERATURE: 97 F | OXYGEN SATURATION: 97 % | HEART RATE: 97 BPM | DIASTOLIC BLOOD PRESSURE: 80 MMHG | RESPIRATION RATE: 18 BRPM

## 2021-04-08 DIAGNOSIS — M25.561 ACUTE PAIN OF BOTH KNEES: ICD-10-CM

## 2021-04-08 DIAGNOSIS — M25.462 EFFUSION OF LEFT KNEE: ICD-10-CM

## 2021-04-08 DIAGNOSIS — M25.562 ACUTE PAIN OF BOTH KNEES: ICD-10-CM

## 2021-04-08 DIAGNOSIS — M25.561 ACUTE PAIN OF BOTH KNEES: Primary | ICD-10-CM

## 2021-04-08 DIAGNOSIS — M25.562 ACUTE PAIN OF BOTH KNEES: Primary | ICD-10-CM

## 2021-04-08 PROCEDURE — 73560 X-RAY EXAM OF KNEE 1 OR 2: CPT | Performed by: INTERNAL MEDICINE

## 2021-04-08 PROCEDURE — 3075F SYST BP GE 130 - 139MM HG: CPT | Performed by: INTERNAL MEDICINE

## 2021-04-08 PROCEDURE — 3079F DIAST BP 80-89 MM HG: CPT | Performed by: INTERNAL MEDICINE

## 2021-04-08 PROCEDURE — 3008F BODY MASS INDEX DOCD: CPT | Performed by: INTERNAL MEDICINE

## 2021-04-08 PROCEDURE — 99214 OFFICE O/P EST MOD 30 MIN: CPT | Performed by: INTERNAL MEDICINE

## 2021-04-08 NOTE — PROGRESS NOTES
Eunice Pimentel is a 45year old female. HPI:   Pt has bilateral knee pain, but worse in the left. She has walking and it felt very unstable. She is overweight, but active and likes to lift. Her left knee now feels weak and tight.  Not swelling in used    Alcohol use: No      Alcohol/week: 0.0 standard drinks    Drug use: No       REVIEW OF SYSTEMS:   GENERAL HEALTH: feels well otherwise  SKIN: denies any unusual skin lesions or rashes  RESPIRATORY: denies shortness of breath with exertion  CARDIOVA of both knees M25.561 Acute pain of both knees       PATIENT STATED HISTORY: (As transcribed by Technologist)  General knee pain            FINDINGS:  Joint spaces are normal.  No fracture, expansile lesion or erosion.  No chondrocalcinosis.  No joint effu

## 2021-04-15 RX ORDER — OMEPRAZOLE 40 MG/1
CAPSULE, DELAYED RELEASE ORAL
Qty: 90 CAPSULE | Refills: 0 | Status: SHIPPED | OUTPATIENT
Start: 2021-04-15 | End: 2021-07-08

## 2021-04-15 NOTE — TELEPHONE ENCOUNTER
Last refill #90 on 1/11/2021  Last office visit pertaining to refill on 4/8/2021 - acute  Last cpx on 9/16/2020  Future Appointments   Date Time Provider Lloyd Glynn   4/15/2021 10:00 AM LYNNETTE Knox [facility] [place]   5/11/2021  9:40 AM

## 2021-05-22 ENCOUNTER — TELEPHONE (OUTPATIENT)
Dept: FAMILY MEDICINE CLINIC | Facility: CLINIC | Age: 41
End: 2021-05-22

## 2021-06-01 ENCOUNTER — OFFICE VISIT (OUTPATIENT)
Dept: FAMILY MEDICINE CLINIC | Facility: CLINIC | Age: 41
End: 2021-06-01
Payer: COMMERCIAL

## 2021-06-01 VITALS
OXYGEN SATURATION: 98 % | DIASTOLIC BLOOD PRESSURE: 80 MMHG | TEMPERATURE: 97 F | SYSTOLIC BLOOD PRESSURE: 130 MMHG | BODY MASS INDEX: 48.03 KG/M2 | WEIGHT: 261 LBS | RESPIRATION RATE: 16 BRPM | HEART RATE: 87 BPM | HEIGHT: 62 IN

## 2021-06-01 DIAGNOSIS — M19.90 ARTHRITIS: Primary | ICD-10-CM

## 2021-06-01 PROCEDURE — 3075F SYST BP GE 130 - 139MM HG: CPT | Performed by: INTERNAL MEDICINE

## 2021-06-01 PROCEDURE — 99213 OFFICE O/P EST LOW 20 MIN: CPT | Performed by: INTERNAL MEDICINE

## 2021-06-01 PROCEDURE — 3079F DIAST BP 80-89 MM HG: CPT | Performed by: INTERNAL MEDICINE

## 2021-06-01 PROCEDURE — 3008F BODY MASS INDEX DOCD: CPT | Performed by: INTERNAL MEDICINE

## 2021-06-01 NOTE — PROGRESS NOTES
Dianna Harding is a 45year old female. HPI:   Pt has been having pain in the knees and the hips. She has come off her antidepressants, but continues to get ketamine infusions every month and has a support network.  She has no neck or shoulder probl AHI 7 Supine AHI 7 Non-supine AHI 7 O2 Matheus 88%   • Vomiting    • Wears glasses       Social History:  Social History    Tobacco Use      Smoking status: Never Smoker      Smokeless tobacco: Never Used      Tobacco comment: Non-smoker    Vaping Use

## 2021-07-08 RX ORDER — OMEPRAZOLE 40 MG/1
CAPSULE, DELAYED RELEASE ORAL
Qty: 90 CAPSULE | Refills: 0 | Status: SHIPPED | OUTPATIENT
Start: 2021-07-08 | End: 2021-08-04

## 2021-07-08 NOTE — TELEPHONE ENCOUNTER
Last OV: 6/1/21  Last refill: 4/15/21 #90 Capsules w/ 0 refills  Requested Prescriptions     Pending Prescriptions Disp Refills   • OMEPRAZOLE 40 MG Oral Capsule Delayed Release [Pharmacy Med Name: OMEPRAZOLE 40MG CAPSULES] 90 capsule 0     Sig: TAKE 1 CAP

## 2021-08-17 ENCOUNTER — HOSPITAL ENCOUNTER (OUTPATIENT)
Dept: GENERAL RADIOLOGY | Age: 41
Discharge: HOME | End: 2021-08-17
Attending: INTERNAL MEDICINE
Payer: COMMERCIAL

## 2021-08-17 ENCOUNTER — OFFICE VISIT (OUTPATIENT)
Dept: FAMILY MEDICINE CLINIC | Facility: CLINIC | Age: 41
End: 2021-08-17
Payer: COMMERCIAL

## 2021-08-17 VITALS
DIASTOLIC BLOOD PRESSURE: 70 MMHG | HEART RATE: 73 BPM | OXYGEN SATURATION: 97 % | WEIGHT: 253 LBS | TEMPERATURE: 98 F | RESPIRATION RATE: 18 BRPM | HEIGHT: 62 IN | BODY MASS INDEX: 46.56 KG/M2 | SYSTOLIC BLOOD PRESSURE: 118 MMHG

## 2021-08-17 DIAGNOSIS — M54.2 NECK PAIN: ICD-10-CM

## 2021-08-17 DIAGNOSIS — M54.50 ACUTE BILATERAL LOW BACK PAIN WITHOUT SCIATICA: Primary | ICD-10-CM

## 2021-08-17 DIAGNOSIS — M54.50 ACUTE BILATERAL LOW BACK PAIN WITHOUT SCIATICA: ICD-10-CM

## 2021-08-17 DIAGNOSIS — R20.0 HAND NUMBNESS: ICD-10-CM

## 2021-08-17 DIAGNOSIS — R20.0 HAND NUMBNESS: Primary | ICD-10-CM

## 2021-08-17 PROBLEM — E66.01 MORBID OBESITY (HCC): Status: ACTIVE | Noted: 2021-08-17

## 2021-08-17 PROCEDURE — 3008F BODY MASS INDEX DOCD: CPT | Performed by: INTERNAL MEDICINE

## 2021-08-17 PROCEDURE — 99214 OFFICE O/P EST MOD 30 MIN: CPT | Performed by: INTERNAL MEDICINE

## 2021-08-17 PROCEDURE — 72050 X-RAY EXAM NECK SPINE 4/5VWS: CPT | Performed by: INTERNAL MEDICINE

## 2021-08-17 PROCEDURE — 3074F SYST BP LT 130 MM HG: CPT | Performed by: INTERNAL MEDICINE

## 2021-08-17 PROCEDURE — 3078F DIAST BP <80 MM HG: CPT | Performed by: INTERNAL MEDICINE

## 2021-08-17 RX ORDER — CYCLOBENZAPRINE HCL 10 MG
10 TABLET ORAL 3 TIMES DAILY
Qty: 30 TABLET | Refills: 0 | Status: SHIPPED | OUTPATIENT
Start: 2021-08-17 | End: 2021-08-27

## 2021-08-17 RX ORDER — AZELASTINE 1 MG/ML
SPRAY, METERED NASAL
COMMUNITY
Start: 2021-07-15 | End: 2021-11-08

## 2021-08-17 NOTE — PROGRESS NOTES
Tamy Tiwari is a 45year old female. HPI:   Pt has pain in the neck and back for 6 weeks. She has numbness in both hands. She is having a hard time holding her phone in bed, the whole arm will get numb. Shampooing her hair is the same.   No fa • Wears glasses       Social History:  Social History    Tobacco Use      Smoking status: Never Smoker      Smokeless tobacco: Never Used      Tobacco comment: Non-smoker    Vaping Use      Vaping Use: Never used    Alcohol use: No      Alcohol/week: 0. prescriptions requested or ordered in this encounter       Imaging & Consults:  XR LUMBAR SPINE (MIN 4 VIEWS) (CPT=72110)    Follow up as needed  The patient indicates understanding of these issues and agrees to the plan.

## 2021-08-25 ENCOUNTER — PATIENT MESSAGE (OUTPATIENT)
Dept: FAMILY MEDICINE CLINIC | Facility: CLINIC | Age: 41
End: 2021-08-25

## 2021-08-25 DIAGNOSIS — M54.12 CERVICAL RADICULOPATHY: Primary | ICD-10-CM

## 2021-08-25 NOTE — TELEPHONE ENCOUNTER
From: Selam Alexandre  To: Sohan Silveira MD  Sent: 8/25/2021 4:09 PM CDT  Subject: Visit Follow-up Question    Hi dr sanchez!      Do I need to do a follow up with you to get a mri referral? It's been over a week since we met and I should be six weeks p

## 2021-08-30 ENCOUNTER — LAB ENCOUNTER (OUTPATIENT)
Dept: LAB | Age: 41
End: 2021-08-30
Attending: INTERNAL MEDICINE
Payer: COMMERCIAL

## 2021-08-30 DIAGNOSIS — K21.9 GERD (GASTROESOPHAGEAL REFLUX DISEASE): ICD-10-CM

## 2021-08-30 LAB — SARS-COV-2 RNA RESP QL NAA+PROBE: NOT DETECTED

## 2021-08-30 NOTE — PROGRESS NOTES
You will be happy to know that your COVID 19 test returned NEGATIVE. If you need any further assistance, please feel free to call [phone]. Thank you for letting us care for you.     Best regards,   Adithya Baird MD  [address] [place] [address]

## 2021-08-31 ENCOUNTER — HOSPITAL ENCOUNTER (OUTPATIENT)
Facility: HOSPITAL | Age: 41
Setting detail: HOSPITAL OUTPATIENT SURGERY
Discharge: HOME | End: 2021-08-31
Attending: INTERNAL MEDICINE | Admitting: INTERNAL MEDICINE
Payer: COMMERCIAL

## 2021-08-31 ENCOUNTER — ANESTHESIA EVENT (OUTPATIENT)
Dept: ENDOSCOPY | Facility: HOSPITAL | Age: 41
End: 2021-08-31
Payer: COMMERCIAL

## 2021-08-31 ENCOUNTER — ANESTHESIA (OUTPATIENT)
Dept: ENDOSCOPY | Facility: HOSPITAL | Age: 41
End: 2021-08-31
Payer: COMMERCIAL

## 2021-08-31 VITALS
BODY MASS INDEX: 46.38 KG/M2 | OXYGEN SATURATION: 100 % | DIASTOLIC BLOOD PRESSURE: 76 MMHG | SYSTOLIC BLOOD PRESSURE: 108 MMHG | HEIGHT: 62 IN | HEART RATE: 78 BPM | RESPIRATION RATE: 18 BRPM | TEMPERATURE: 98 F | WEIGHT: 252 LBS

## 2021-08-31 DIAGNOSIS — K21.9 GASTROESOPHAGEAL REFLUX DISEASE, UNSPECIFIED WHETHER ESOPHAGITIS PRESENT: ICD-10-CM

## 2021-08-31 DIAGNOSIS — K21.9 GERD (GASTROESOPHAGEAL REFLUX DISEASE): Primary | ICD-10-CM

## 2021-08-31 DIAGNOSIS — E66.01 MORBID OBESITY (HCC): ICD-10-CM

## 2021-08-31 LAB — B-HCG UR QL: NEGATIVE

## 2021-08-31 PROCEDURE — 81025 URINE PREGNANCY TEST: CPT

## 2021-08-31 PROCEDURE — 88305 TISSUE EXAM BY PATHOLOGIST: CPT | Performed by: INTERNAL MEDICINE

## 2021-08-31 PROCEDURE — 0DB68ZX EXCISION OF STOMACH, VIA NATURAL OR ARTIFICIAL OPENING ENDOSCOPIC, DIAGNOSTIC: ICD-10-PCS | Performed by: INTERNAL MEDICINE

## 2021-08-31 RX ORDER — SODIUM CHLORIDE, SODIUM LACTATE, POTASSIUM CHLORIDE, CALCIUM CHLORIDE 600; 310; 30; 20 MG/100ML; MG/100ML; MG/100ML; MG/100ML
INJECTION, SOLUTION INTRAVENOUS CONTINUOUS
Status: DISCONTINUED | OUTPATIENT
Start: 2021-08-31 | End: 2021-08-31

## 2021-08-31 RX ORDER — LIDOCAINE HYDROCHLORIDE 10 MG/ML
INJECTION, SOLUTION EPIDURAL; INFILTRATION; INTRACAUDAL; PERINEURAL AS NEEDED
Status: DISCONTINUED | OUTPATIENT
Start: 2021-08-31 | End: 2021-08-31 | Stop reason: SURG

## 2021-08-31 RX ADMIN — LIDOCAINE HYDROCHLORIDE 25 MG: 10 INJECTION, SOLUTION EPIDURAL; INFILTRATION; INTRACAUDAL; PERINEURAL at 13:37:00

## 2021-08-31 NOTE — ANESTHESIA POSTPROCEDURE EVALUATION
[facility] Patient Status:  Hospital Outpatient Surgery   Age/Gender 45year old female MRN [id number]   Location [address] [address] Attending Uzma Mensah MD   [facility] Day # 0 PCP MD Clarisa Valera

## 2021-08-31 NOTE — ANESTHESIA PREPROCEDURE EVALUATION
PRE-OP EVALUATION    Patient Name: Precious Dominguez    Admit Diagnosis: Morbid obesity ([facility] [E66.01]  Gastroesophageal reflux disease, unspecified whether esophagitis present [K21.9]    Pre-op Diagnosis: Morbid obesity ([facility] [E66.01]Gastroesophageal Solution, SPRAY 2 SPRAYS IN EACH NOSTRIL TWICE DAILY AS NEEDED, Disp: , Rfl: , More than a month at Unknown time  Fluticasone Propionate 50 MCG/ACT Nasal Suspension, 2 sprays by Each Nare route daily.  (Patient taking differently: 2 sprays by Each Nare rout

## 2021-08-31 NOTE — OPERATIVE REPORT
[facility]                                                                                                        EGD Operative Report    Marla Weeks Patient Status:  [facility] condition. Findings: normal EGD with gastric biopsies taken. Recommendations: await pathology  Discharge: The patient was given an after visit summary detailing the procedure, findings, recommendations and follow up plans.   Mariya Villasenor MD  8/31/2021  1:3

## 2021-08-31 NOTE — ANESTHESIA POSTPROCEDURE EVALUATION
[facility] Patient Status:  Hospital Outpatient Surgery   Age/Gender 45year old female MRN [id number]   Location [address] [address] Attending No att. providers found   [facility] Day # 0 PCP Madan Doll MD

## 2021-08-31 NOTE — H&P
The H&P dated 8/27/21 by Garret Ward MD was reviewed by Sunni Beebe MD today 8/31/21, the patient was examined and no significant changes have occurred in the patient's condition since the H&P was performed.   I discussed with the patient and/or legal repre

## 2021-09-07 NOTE — PROGRESS NOTES
9/7/2021  [address]  [address] [address]    Dear Rickreall,       Here are the biopsy/pathology findings from your recent EGD (Upper  Endoscopy):    a normal biopsy of the stomach with no evidence of inflammation and a negati

## 2021-09-08 ENCOUNTER — OFFICE VISIT (OUTPATIENT)
Dept: FAMILY MEDICINE CLINIC | Facility: CLINIC | Age: 41
End: 2021-09-08
Payer: COMMERCIAL

## 2021-09-08 ENCOUNTER — LAB ENCOUNTER (OUTPATIENT)
Dept: LAB | Age: 41
End: 2021-09-08
Attending: INTERNAL MEDICINE
Payer: COMMERCIAL

## 2021-09-08 VITALS
HEART RATE: 85 BPM | HEIGHT: 62 IN | TEMPERATURE: 98 F | OXYGEN SATURATION: 97 % | DIASTOLIC BLOOD PRESSURE: 72 MMHG | RESPIRATION RATE: 18 BRPM | WEIGHT: 253 LBS | SYSTOLIC BLOOD PRESSURE: 110 MMHG | BODY MASS INDEX: 46.56 KG/M2

## 2021-09-08 DIAGNOSIS — Z01.810 PREOP CARDIOVASCULAR EXAM: ICD-10-CM

## 2021-09-08 DIAGNOSIS — E66.01 MORBID OBESITY (HCC): ICD-10-CM

## 2021-09-08 DIAGNOSIS — Z01.818 PREOP TESTING: ICD-10-CM

## 2021-09-08 DIAGNOSIS — M19.90 ARTHRITIS: ICD-10-CM

## 2021-09-08 LAB
ALBUMIN SERPL-MCNC: 3.4 G/DL (ref 3.4–5)
ALBUMIN/GLOB SERPL: 1 {RATIO} (ref 1–2)
ALP LIVER SERPL-CCNC: 66 U/L
ALT SERPL-CCNC: 17 U/L
ANION GAP SERPL CALC-SCNC: 1 MMOL/L (ref 0–18)
AST SERPL-CCNC: 8 U/L (ref 15–37)
BASOPHILS # BLD AUTO: 0.04 X10(3) UL (ref 0–0.2)
BASOPHILS NFR BLD AUTO: 0.5 %
BILIRUB SERPL-MCNC: 0.2 MG/DL (ref 0.1–2)
BUN BLD-MCNC: 12 MG/DL (ref 7–18)
CALCIUM BLD-MCNC: 8.8 MG/DL (ref 8.5–10.1)
CHLORIDE SERPL-SCNC: 108 MMOL/L (ref 98–112)
CHOLEST SMN-MCNC: 229 MG/DL (ref ?–200)
CO2 SERPL-SCNC: 27 MMOL/L (ref 21–32)
CREAT BLD-MCNC: 0.76 MG/DL
CRP SERPL-MCNC: 0.36 MG/DL (ref ?–0.3)
EOSINOPHIL # BLD AUTO: 0.44 X10(3) UL (ref 0–0.7)
EOSINOPHIL NFR BLD AUTO: 5.1 %
ERYTHROCYTE [DISTWIDTH] IN BLOOD BY AUTOMATED COUNT: 14.8 %
FOLATE SERPL-MCNC: 10.6 NG/ML (ref 8.7–?)
GLOBULIN PLAS-MCNC: 3.5 G/DL (ref 2.8–4.4)
GLUCOSE BLD-MCNC: 84 MG/DL (ref 70–99)
HCT VFR BLD AUTO: 40.2 %
HDLC SERPL-MCNC: 34 MG/DL (ref 40–59)
HGB BLD-MCNC: 12.2 G/DL
IMM GRANULOCYTES # BLD AUTO: 0.03 X10(3) UL (ref 0–1)
IMM GRANULOCYTES NFR BLD: 0.3 %
IRON SATURATION: 12 %
IRON SERPL-MCNC: 49 UG/DL
LDLC SERPL CALC-MCNC: 125 MG/DL (ref ?–100)
LYMPHOCYTES # BLD AUTO: 2.6 X10(3) UL (ref 1–4)
LYMPHOCYTES NFR BLD AUTO: 30.1 %
M PROTEIN MFR SERPL ELPH: 6.9 G/DL (ref 6.4–8.2)
MCH RBC QN AUTO: 26 PG (ref 26–34)
MCHC RBC AUTO-ENTMCNC: 30.3 G/DL (ref 31–37)
MCV RBC AUTO: 85.7 FL
MONOCYTES # BLD AUTO: 0.48 X10(3) UL (ref 0.1–1)
MONOCYTES NFR BLD AUTO: 5.5 %
NEUTROPHILS # BLD AUTO: 5.06 X10 (3) UL (ref 1.5–7.7)
NEUTROPHILS # BLD AUTO: 5.06 X10(3) UL (ref 1.5–7.7)
NEUTROPHILS NFR BLD AUTO: 58.5 %
NONHDLC SERPL-MCNC: 195 MG/DL (ref ?–130)
OSMOLALITY SERPL CALC.SUM OF ELEC: 281 MOSM/KG (ref 275–295)
PATIENT FASTING Y/N/NP: YES
PATIENT FASTING Y/N/NP: YES
PLATELET # BLD AUTO: 431 10(3)UL (ref 150–450)
POTASSIUM SERPL-SCNC: 4.5 MMOL/L (ref 3.5–5.1)
RBC # BLD AUTO: 4.69 X10(6)UL
RHEUMATOID FACT SERPL-ACNC: 20 IU/ML (ref ?–15)
SED RATE-ML: 11 MM/HR
SODIUM SERPL-SCNC: 136 MMOL/L (ref 136–145)
T4 FREE SERPL-MCNC: 0.9 NG/DL (ref 0.8–1.7)
TOTAL IRON BINDING CAPACITY: 426 UG/DL (ref 240–450)
TRANSFERRIN SERPL-MCNC: 286 MG/DL (ref 200–360)
TRIGL SERPL-MCNC: 392 MG/DL (ref 30–149)
TSI SER-ACNC: 0.38 MIU/ML (ref 0.36–3.74)
URATE SERPL-MCNC: 5.2 MG/DL
VIT B12 SERPL-MCNC: 358 PG/ML (ref 193–986)
VIT D+METAB SERPL-MCNC: 54 NG/ML (ref 30–100)
VLDLC SERPL CALC-MCNC: 72 MG/DL (ref 0–30)
WBC # BLD AUTO: 8.7 X10(3) UL (ref 4–11)

## 2021-09-08 PROCEDURE — 3074F SYST BP LT 130 MM HG: CPT | Performed by: INTERNAL MEDICINE

## 2021-09-08 PROCEDURE — 84425 ASSAY OF VITAMIN B-1: CPT | Performed by: INTERNAL MEDICINE

## 2021-09-08 PROCEDURE — 86140 C-REACTIVE PROTEIN: CPT | Performed by: INTERNAL MEDICINE

## 2021-09-08 PROCEDURE — 93000 ELECTROCARDIOGRAM COMPLETE: CPT | Performed by: INTERNAL MEDICINE

## 2021-09-08 PROCEDURE — 84439 ASSAY OF FREE THYROXINE: CPT | Performed by: INTERNAL MEDICINE

## 2021-09-08 PROCEDURE — 83540 ASSAY OF IRON: CPT | Performed by: INTERNAL MEDICINE

## 2021-09-08 PROCEDURE — 85025 COMPLETE CBC W/AUTO DIFF WBC: CPT | Performed by: INTERNAL MEDICINE

## 2021-09-08 PROCEDURE — 86200 CCP ANTIBODY: CPT | Performed by: INTERNAL MEDICINE

## 2021-09-08 PROCEDURE — 3008F BODY MASS INDEX DOCD: CPT | Performed by: INTERNAL MEDICINE

## 2021-09-08 PROCEDURE — 84443 ASSAY THYROID STIM HORMONE: CPT | Performed by: INTERNAL MEDICINE

## 2021-09-08 PROCEDURE — 84590 ASSAY OF VITAMIN A: CPT | Performed by: INTERNAL MEDICINE

## 2021-09-08 PROCEDURE — 84597 ASSAY OF VITAMIN K: CPT | Performed by: INTERNAL MEDICINE

## 2021-09-08 PROCEDURE — 84550 ASSAY OF BLOOD/URIC ACID: CPT | Performed by: INTERNAL MEDICINE

## 2021-09-08 PROCEDURE — 86431 RHEUMATOID FACTOR QUANT: CPT | Performed by: INTERNAL MEDICINE

## 2021-09-08 PROCEDURE — 85652 RBC SED RATE AUTOMATED: CPT | Performed by: INTERNAL MEDICINE

## 2021-09-08 PROCEDURE — 83550 IRON BINDING TEST: CPT | Performed by: INTERNAL MEDICINE

## 2021-09-08 PROCEDURE — 86038 ANTINUCLEAR ANTIBODIES: CPT | Performed by: INTERNAL MEDICINE

## 2021-09-08 PROCEDURE — 99244 OFF/OP CNSLTJ NEW/EST MOD 40: CPT | Performed by: INTERNAL MEDICINE

## 2021-09-08 PROCEDURE — 80061 LIPID PANEL: CPT | Performed by: INTERNAL MEDICINE

## 2021-09-08 PROCEDURE — 3078F DIAST BP <80 MM HG: CPT | Performed by: INTERNAL MEDICINE

## 2021-09-08 PROCEDURE — 82306 VITAMIN D 25 HYDROXY: CPT | Performed by: INTERNAL MEDICINE

## 2021-09-08 PROCEDURE — 84207 ASSAY OF VITAMIN B-6: CPT | Performed by: INTERNAL MEDICINE

## 2021-09-09 NOTE — PROGRESS NOTES
Moreno Oneal is a 45year old female who presents for a pre-operative physical exam. Patient is to have gastric sleeve, to be done by Dr. Crow Matthews at [facility] on TBD. HPI:   Pt complains of overweight status and arthritic pains.   She has dieted most depression    • Indigestion    • EMILIANO (obstructive sleep apnea) 10/01/2020 HST    AHI 7 Supine AHI 7 Non-supine AHI 7 O2 Matheus 88%   • Sleep apnea     CPAP   • Vomiting    • Wears glasses       Past Surgical History:   Procedure Laterality Date   • BREAST B tenderness  BREAST: no dominant or suspicious mass  LUNGS: clear to auscultation  CARDIO: RRR without murmur  GI: good BS's,no masses, HSM or tenderness  : deferred  RECTAL: good rectal tone, prostate shows no masses, stool is OB negative  MUSCULOSKELETA

## 2021-09-10 LAB
ANA SCREEN: NEGATIVE
CCP IGG SERPL-ACNC: <0.4 U/ML (ref 0–6.9)

## 2021-09-12 LAB
INTERPRETATION VIT A, SER/PLA: NORMAL
RETINYL PALMITATE: 0.06 MG/L
VITAMIN A (RETINOL): 0.83 MG/L
VITAMIN K1, SERUM: 2.4 NMOL/L

## 2021-09-13 LAB
VITAMIN B1 (THIAMINE), WHOLE B: 151 NMOL/L
VITAMIN B6: 18.5 NMOL/L

## 2021-09-16 ENCOUNTER — TELEPHONE (OUTPATIENT)
Dept: FAMILY MEDICINE CLINIC | Facility: CLINIC | Age: 41
End: 2021-09-16

## 2021-09-16 NOTE — TELEPHONE ENCOUNTER
Mariana Marie sent to [facility] Clinical Staff  Good Afternoon,     The following request has been denied.  Please see the following response in regards to this request.     Advanced imaging may be appropriate when the patient has completed physical th

## 2021-10-05 ENCOUNTER — MED REC SCAN ONLY (OUTPATIENT)
Dept: FAMILY MEDICINE CLINIC | Facility: CLINIC | Age: 41
End: 2021-10-05

## 2021-10-30 NOTE — PROGRESS NOTES
Dx: R shoulder impingement         Authorized # of Visits:  8         Next MD visit: none scheduled  Fall Risk: standard         Precautions: n/a             Subjective: Shoulder a little fatigued from yesterday, but less pain.     Objective:   AROM:   Shou mobilization-5' grade 3 inf/post Joint mobilization-5' grade 3 inf/post       Stretching   pec-3'  Post capsule 10\"x10 Stretching   pec-3'  Post capsule 10\"x10 Stretching   pec-3'  Post capsule 10\"x10       Side ER 2# 3x10 Side ER 2# 3x10 Side ER 2# 3x1 Alert-The patient is alert, awake and responds to voice. The patient is oriented to time, place, and person. The triage nurse is able to obtain subjective information.

## 2021-11-08 ENCOUNTER — OFFICE VISIT (OUTPATIENT)
Dept: FAMILY MEDICINE CLINIC | Facility: CLINIC | Age: 41
End: 2021-11-08
Payer: COMMERCIAL

## 2021-11-08 VITALS
RESPIRATION RATE: 18 BRPM | DIASTOLIC BLOOD PRESSURE: 74 MMHG | HEIGHT: 62 IN | SYSTOLIC BLOOD PRESSURE: 116 MMHG | TEMPERATURE: 99 F | HEART RATE: 73 BPM | BODY MASS INDEX: 47.87 KG/M2 | WEIGHT: 260.13 LBS | OXYGEN SATURATION: 98 %

## 2021-11-08 DIAGNOSIS — Z01.810 PREOP CARDIOVASCULAR EXAM: ICD-10-CM

## 2021-11-08 DIAGNOSIS — E66.01 MORBID OBESITY (HCC): Primary | ICD-10-CM

## 2021-11-08 DIAGNOSIS — K21.9 GASTROESOPHAGEAL REFLUX DISEASE WITHOUT ESOPHAGITIS: ICD-10-CM

## 2021-11-08 PROCEDURE — 3078F DIAST BP <80 MM HG: CPT | Performed by: INTERNAL MEDICINE

## 2021-11-08 PROCEDURE — 3074F SYST BP LT 130 MM HG: CPT | Performed by: INTERNAL MEDICINE

## 2021-11-08 PROCEDURE — 99243 OFF/OP CNSLTJ NEW/EST LOW 30: CPT | Performed by: INTERNAL MEDICINE

## 2021-11-08 PROCEDURE — 3008F BODY MASS INDEX DOCD: CPT | Performed by: INTERNAL MEDICINE

## 2021-11-08 NOTE — PROGRESS NOTES
Jazz Calle is a 37year old female who presents for a pre-operative physical exam. Patient is to have gastric bypass, to be done by Dr. Yeison Cho at [facility] on 11/18/2021. HPI:   Pt complains of nothing. She has been in good physical shape.   She h reflux    • Hiatal hernia    • High cholesterol    • History of depression    • Indigestion    • EMILIANO (obstructive sleep apnea) 10/01/2020 HST    AHI 7 Supine AHI 7 Non-supine AHI 7 O2 Matheus 88%   • Sleep apnea     CPAP   • Vomiting    • Wears glasses supple,no adenopathy,no bruits  CHEST: no chest tenderness  BREAST: no dominant or suspicious mass  LUNGS: clear to auscultation  CARDIO: RRR without murmur  GI: good BS's,no masses, HSM or tenderness  : deferred  RECTAL: good rectal tone, prostate shows

## 2022-01-05 DIAGNOSIS — K44.9 HIATAL HERNIA: ICD-10-CM

## 2022-01-05 RX ORDER — OMEPRAZOLE 40 MG/1
CAPSULE, DELAYED RELEASE ORAL
Qty: 90 CAPSULE | Refills: 0 | Status: SHIPPED | OUTPATIENT
Start: 2022-01-05

## 2022-03-07 RX ORDER — OMEPRAZOLE 40 MG/1
CAPSULE, DELAYED RELEASE ORAL
Qty: 90 CAPSULE | Refills: 0 | Status: SHIPPED | OUTPATIENT
Start: 2022-03-07

## 2022-03-23 ENCOUNTER — LABORATORY ENCOUNTER (OUTPATIENT)
Dept: LAB | Age: 42
End: 2022-03-23
Attending: NURSE PRACTITIONER
Payer: COMMERCIAL

## 2022-03-23 DIAGNOSIS — Z90.3 HISTORY OF SLEEVE GASTRECTOMY: ICD-10-CM

## 2022-03-23 DIAGNOSIS — Z51.81 ENCOUNTER FOR THERAPEUTIC DRUG MONITORING: ICD-10-CM

## 2022-03-23 DIAGNOSIS — E88.81 INSULIN RESISTANCE: ICD-10-CM

## 2022-03-23 DIAGNOSIS — E78.1 HYPERTRIGLYCERIDEMIA: ICD-10-CM

## 2022-03-23 DIAGNOSIS — E66.01 MORBID OBESITY WITH BMI OF 45.0-49.9, ADULT (HCC): ICD-10-CM

## 2022-03-23 LAB
ANION GAP SERPL CALC-SCNC: 6 MMOL/L (ref 0–18)
BASOPHILS # BLD AUTO: 0.06 X10(3) UL (ref 0–0.2)
BASOPHILS NFR BLD AUTO: 0.7 %
BUN BLD-MCNC: 12 MG/DL (ref 7–18)
CALCIUM BLD-MCNC: 9.8 MG/DL (ref 8.5–10.1)
CHLORIDE SERPL-SCNC: 109 MMOL/L (ref 98–112)
CHOLEST SERPL-MCNC: 186 MG/DL (ref ?–200)
CO2 SERPL-SCNC: 25 MMOL/L (ref 21–32)
CREAT BLD-MCNC: 0.98 MG/DL
EOSINOPHIL # BLD AUTO: 0.27 X10(3) UL (ref 0–0.7)
EOSINOPHIL NFR BLD AUTO: 3.1 %
ERYTHROCYTE [DISTWIDTH] IN BLOOD BY AUTOMATED COUNT: 15 %
EST. AVERAGE GLUCOSE BLD GHB EST-MCNC: 94 MG/DL (ref 68–126)
FASTING PATIENT LIPID ANSWER: YES
FASTING STATUS PATIENT QL REPORTED: YES
FOLATE SERPL-MCNC: 9.7 NG/ML (ref 8.7–?)
GLUCOSE BLD-MCNC: 84 MG/DL (ref 70–99)
HBA1C MFR BLD: 4.9 % (ref ?–5.7)
HCT VFR BLD AUTO: 42.9 %
HDLC SERPL-MCNC: 31 MG/DL (ref 40–59)
HGB BLD-MCNC: 13.8 G/DL
IMM GRANULOCYTES # BLD AUTO: 0.03 X10(3) UL (ref 0–1)
IMM GRANULOCYTES NFR BLD: 0.3 %
IRON SATN MFR SERPL: 30 %
IRON SERPL-MCNC: 112 UG/DL
LDLC SERPL CALC-MCNC: 121 MG/DL (ref ?–100)
LYMPHOCYTES # BLD AUTO: 2.7 X10(3) UL (ref 1–4)
LYMPHOCYTES NFR BLD AUTO: 30.5 %
MCH RBC QN AUTO: 28 PG (ref 26–34)
MCHC RBC AUTO-ENTMCNC: 32.2 G/DL (ref 31–37)
MCV RBC AUTO: 87 FL
MONOCYTES # BLD AUTO: 0.52 X10(3) UL (ref 0.1–1)
MONOCYTES NFR BLD AUTO: 5.9 %
NEUTROPHILS # BLD AUTO: 5.27 X10 (3) UL (ref 1.5–7.7)
NEUTROPHILS # BLD AUTO: 5.27 X10(3) UL (ref 1.5–7.7)
NONHDLC SERPL-MCNC: 155 MG/DL (ref ?–130)
OSMOLALITY SERPL CALC.SUM OF ELEC: 289 MOSM/KG (ref 275–295)
PLATELET # BLD AUTO: 409 10(3)UL (ref 150–450)
POTASSIUM SERPL-SCNC: 4.5 MMOL/L (ref 3.5–5.1)
PREALB SERPL-MCNC: 27.4 MG/DL (ref 20–40)
RBC # BLD AUTO: 4.93 X10(6)UL
SODIUM SERPL-SCNC: 140 MMOL/L (ref 136–145)
TIBC SERPL-MCNC: 373 UG/DL (ref 240–450)
TRANSFERRIN SERPL-MCNC: 250 MG/DL (ref 200–360)
TRIGL SERPL-MCNC: 191 MG/DL (ref 30–149)
TSI SER-ACNC: 1.26 MIU/ML (ref 0.36–3.74)
VIT B12 SERPL-MCNC: 410 PG/ML (ref 193–986)
VIT D+METAB SERPL-MCNC: 63.7 NG/ML (ref 30–100)
VLDLC SERPL CALC-MCNC: 34 MG/DL (ref 0–30)
WBC # BLD AUTO: 8.9 X10(3) UL (ref 4–11)

## 2022-03-23 PROCEDURE — 85025 COMPLETE CBC W/AUTO DIFF WBC: CPT

## 2022-03-23 PROCEDURE — 83036 HEMOGLOBIN GLYCOSYLATED A1C: CPT

## 2022-03-23 PROCEDURE — 83540 ASSAY OF IRON: CPT

## 2022-03-23 PROCEDURE — 82607 VITAMIN B-12: CPT

## 2022-03-23 PROCEDURE — 84443 ASSAY THYROID STIM HORMONE: CPT

## 2022-03-23 PROCEDURE — 82306 VITAMIN D 25 HYDROXY: CPT

## 2022-03-23 PROCEDURE — 84590 ASSAY OF VITAMIN A: CPT

## 2022-03-23 PROCEDURE — 36415 COLL VENOUS BLD VENIPUNCTURE: CPT

## 2022-03-23 PROCEDURE — 84134 ASSAY OF PREALBUMIN: CPT

## 2022-03-23 PROCEDURE — 83550 IRON BINDING TEST: CPT

## 2022-03-23 PROCEDURE — 84425 ASSAY OF VITAMIN B-1: CPT

## 2022-03-23 PROCEDURE — 80061 LIPID PANEL: CPT

## 2022-03-23 PROCEDURE — 82746 ASSAY OF FOLIC ACID SERUM: CPT

## 2022-03-23 PROCEDURE — 80048 BASIC METABOLIC PNL TOTAL CA: CPT

## 2022-03-26 NOTE — PROGRESS NOTES
Notified patient via [employer]  BMP, A1c  and TSH, are normal. Your ttriglycerides and LDL have improved but HDL a bit lower if you increase exercise HDL will come up.

## 2022-03-27 LAB
INTERPRETATION VIT A, SER/PLA: NORMAL
RETINYL PALMITATE: 0.02 MG/L
VITAMIN A (RETINOL): 0.79 MG/L

## 2022-03-28 LAB — VITAMIN B1 (THIAMINE), WHOLE B: 135 NMOL/L

## 2022-06-16 DIAGNOSIS — K44.9 HIATAL HERNIA: ICD-10-CM

## 2022-06-16 RX ORDER — OMEPRAZOLE 40 MG/1
40 CAPSULE, DELAYED RELEASE ORAL DAILY
Qty: 90 CAPSULE | Refills: 0 | Status: SHIPPED | OUTPATIENT
Start: 2022-06-16

## 2022-06-16 NOTE — TELEPHONE ENCOUNTER
LOV  11/08/2021    LAST LAB  03/23/2022    LAST RX  03/07/2022    Next OV  No future appointments.       PROTOCOL  No protocol

## 2022-08-20 ENCOUNTER — OFFICE VISIT (OUTPATIENT)
Dept: FAMILY MEDICINE CLINIC | Facility: CLINIC | Age: 42
End: 2022-08-20
Payer: COMMERCIAL

## 2022-08-20 VITALS
BODY MASS INDEX: 31.28 KG/M2 | DIASTOLIC BLOOD PRESSURE: 50 MMHG | TEMPERATURE: 98 F | RESPIRATION RATE: 14 BRPM | OXYGEN SATURATION: 98 % | WEIGHT: 170 LBS | SYSTOLIC BLOOD PRESSURE: 98 MMHG | HEART RATE: 83 BPM | HEIGHT: 62 IN

## 2022-08-20 DIAGNOSIS — N30.01 ACUTE CYSTITIS WITH HEMATURIA: Primary | ICD-10-CM

## 2022-08-20 DIAGNOSIS — R39.9 UTI SYMPTOMS: ICD-10-CM

## 2022-08-20 LAB
BILIRUBIN: NEGATIVE
GLUCOSE (URINE DIPSTICK): NEGATIVE MG/DL
KETONES (URINE DIPSTICK): NEGATIVE MG/DL
MULTISTIX LOT#: ABNORMAL NUMERIC
NITRITE, URINE: POSITIVE
PH, URINE: 7.5 (ref 4.5–8)
PROTEIN (URINE DIPSTICK): 100 MG/DL
SPECIFIC GRAVITY: 1.02 (ref 1–1.03)
URINE-COLOR: YELLOW
UROBILINOGEN,SEMI-QN: 0.2 MG/DL (ref 0–1.9)

## 2022-08-20 PROCEDURE — 87077 CULTURE AEROBIC IDENTIFY: CPT | Performed by: FAMILY MEDICINE

## 2022-08-20 PROCEDURE — 87186 SC STD MICRODIL/AGAR DIL: CPT | Performed by: FAMILY MEDICINE

## 2022-08-20 PROCEDURE — 87086 URINE CULTURE/COLONY COUNT: CPT | Performed by: FAMILY MEDICINE

## 2022-08-20 RX ORDER — AZELASTINE 1 MG/ML
SPRAY, METERED NASAL
COMMUNITY
Start: 2022-03-27

## 2022-08-20 RX ORDER — NITROFURANTOIN 25; 75 MG/1; MG/1
100 CAPSULE ORAL 2 TIMES DAILY
Qty: 14 CAPSULE | Refills: 0 | Status: SHIPPED | OUTPATIENT
Start: 2022-08-20

## 2022-08-20 RX ORDER — FLUTICASONE PROPIONATE 50 MCG
2 SPRAY, SUSPENSION (ML) NASAL DAILY
COMMUNITY
Start: 2022-06-27

## 2022-10-19 DIAGNOSIS — K44.9 HIATAL HERNIA: ICD-10-CM

## 2022-10-19 NOTE — TELEPHONE ENCOUNTER
Last refill - 6/16/22 - #90   Last office visit - 9/8/21  No future appointments.   [facility] sent - due for physical and labs

## 2022-10-27 ENCOUNTER — PATIENT MESSAGE (OUTPATIENT)
Dept: FAMILY MEDICINE CLINIC | Facility: CLINIC | Age: 42
End: 2022-10-27

## 2022-10-27 DIAGNOSIS — K44.9 HIATAL HERNIA: ICD-10-CM

## 2022-10-28 RX ORDER — OMEPRAZOLE 40 MG/1
40 CAPSULE, DELAYED RELEASE ORAL DAILY
Qty: 90 CAPSULE | Refills: 0 | Status: SHIPPED | OUTPATIENT
Start: 2022-10-28

## 2022-10-28 RX ORDER — OMEPRAZOLE 40 MG/1
40 CAPSULE, DELAYED RELEASE ORAL DAILY
Qty: 90 CAPSULE | Refills: 0 | OUTPATIENT
Start: 2022-10-28

## 2022-10-28 NOTE — TELEPHONE ENCOUNTER
Last refill - 6/16/22 - #90   Last office visit - 11/8/21  No future appointments.   [facility] sent - due for physical and labs

## 2022-10-28 NOTE — TELEPHONE ENCOUNTER
From: Zack Wood  To: Yared Nugent MD  Sent: 10/27/2022 3:51 PM CDT  Subject: Prescription     Hi Dr Alicia Rausch! I hope you are well. Could I get a refill on omeprazole please? Thank you!!!     Take Asher Fisher

## 2022-11-28 ENCOUNTER — OFFICE VISIT (OUTPATIENT)
Dept: FAMILY MEDICINE CLINIC | Facility: CLINIC | Age: 42
End: 2022-11-28
Payer: COMMERCIAL

## 2022-11-28 VITALS
RESPIRATION RATE: 16 BRPM | TEMPERATURE: 98 F | DIASTOLIC BLOOD PRESSURE: 66 MMHG | WEIGHT: 171.38 LBS | BODY MASS INDEX: 31 KG/M2 | OXYGEN SATURATION: 99 % | HEART RATE: 70 BPM | SYSTOLIC BLOOD PRESSURE: 108 MMHG

## 2022-11-28 DIAGNOSIS — M54.41 ACUTE RIGHT-SIDED LOW BACK PAIN WITH RIGHT-SIDED SCIATICA: Primary | ICD-10-CM

## 2022-11-28 DIAGNOSIS — M25.512 LEFT SHOULDER PAIN, UNSPECIFIED CHRONICITY: ICD-10-CM

## 2022-11-28 PROCEDURE — 3078F DIAST BP <80 MM HG: CPT | Performed by: INTERNAL MEDICINE

## 2022-11-28 PROCEDURE — 3074F SYST BP LT 130 MM HG: CPT | Performed by: INTERNAL MEDICINE

## 2022-11-28 PROCEDURE — 99214 OFFICE O/P EST MOD 30 MIN: CPT | Performed by: INTERNAL MEDICINE

## 2022-11-28 RX ORDER — CYCLOBENZAPRINE HCL 10 MG
10 TABLET ORAL 3 TIMES DAILY PRN
Qty: 21 TABLET | Refills: 0 | Status: SHIPPED | OUTPATIENT
Start: 2022-11-28

## 2022-11-28 RX ORDER — TRAMADOL HYDROCHLORIDE 50 MG/1
50 TABLET ORAL EVERY 8 HOURS PRN
Qty: 21 TABLET | Refills: 0 | Status: SHIPPED | OUTPATIENT
Start: 2022-11-28 | End: 2022-12-05

## 2022-12-07 ENCOUNTER — PATIENT MESSAGE (OUTPATIENT)
Dept: FAMILY MEDICINE CLINIC | Facility: CLINIC | Age: 42
End: 2022-12-07

## 2022-12-09 RX ORDER — CYCLOBENZAPRINE HCL 10 MG
10 TABLET ORAL 3 TIMES DAILY PRN
Qty: 21 TABLET | Refills: 0 | Status: SHIPPED | OUTPATIENT
Start: 2022-12-09

## 2022-12-27 NOTE — TELEPHONE ENCOUNTER
Last refill -   Tramadol - 11/28/22 - #21  Cyclobenzaprine - 12/9/22 - - #21  Last office visit - 11/28/22

## 2022-12-28 NOTE — TELEPHONE ENCOUNTER
I can not refill her tramadol at this time due to my brian. Please forward this to a different provider. Thank you.

## 2022-12-29 RX ORDER — CYCLOBENZAPRINE HCL 10 MG
TABLET ORAL
Qty: 21 TABLET | Refills: 0 | Status: SHIPPED | OUTPATIENT
Start: 2022-12-29

## 2022-12-29 RX ORDER — TRAMADOL HYDROCHLORIDE 50 MG/1
TABLET ORAL
Qty: 21 TABLET | Refills: 0 | Status: SHIPPED | OUTPATIENT
Start: 2022-12-29

## 2023-01-04 ENCOUNTER — OFFICE VISIT (OUTPATIENT)
Dept: FAMILY MEDICINE CLINIC | Facility: CLINIC | Age: 43
End: 2023-01-04
Payer: COMMERCIAL

## 2023-01-04 VITALS
WEIGHT: 160 LBS | DIASTOLIC BLOOD PRESSURE: 73 MMHG | OXYGEN SATURATION: 97 % | HEIGHT: 62 IN | RESPIRATION RATE: 18 BRPM | TEMPERATURE: 98 F | HEART RATE: 95 BPM | BODY MASS INDEX: 29.44 KG/M2 | SYSTOLIC BLOOD PRESSURE: 118 MMHG

## 2023-01-04 DIAGNOSIS — R39.9 UTI SYMPTOMS: Primary | ICD-10-CM

## 2023-01-04 LAB
BILIRUBIN: NEGATIVE
CONTROL LINE PRESENT WITH A CLEAR BACKGROUND (YES/NO): YES YES/NO
GLUCOSE (URINE DIPSTICK): NEGATIVE MG/DL
KETONES (URINE DIPSTICK): NEGATIVE MG/DL
MULTISTIX LOT#: ABNORMAL NUMERIC
NITRITE, URINE: NEGATIVE
PH, URINE: 6 (ref 4.5–8)
PREGNANCY TEST, URINE: NEGATIVE
PROTEIN (URINE DIPSTICK): 30 MG/DL
SPECIFIC GRAVITY: 1.02 (ref 1–1.03)
URINE-COLOR: YELLOW
UROBILINOGEN,SEMI-QN: 0.2 MG/DL (ref 0–1.9)

## 2023-01-04 PROCEDURE — 3074F SYST BP LT 130 MM HG: CPT | Performed by: NURSE PRACTITIONER

## 2023-01-04 PROCEDURE — 87086 URINE CULTURE/COLONY COUNT: CPT | Performed by: NURSE PRACTITIONER

## 2023-01-04 PROCEDURE — 99213 OFFICE O/P EST LOW 20 MIN: CPT | Performed by: NURSE PRACTITIONER

## 2023-01-04 PROCEDURE — 81003 URINALYSIS AUTO W/O SCOPE: CPT | Performed by: NURSE PRACTITIONER

## 2023-01-04 PROCEDURE — 3008F BODY MASS INDEX DOCD: CPT | Performed by: NURSE PRACTITIONER

## 2023-01-04 PROCEDURE — 3078F DIAST BP <80 MM HG: CPT | Performed by: NURSE PRACTITIONER

## 2023-01-04 PROCEDURE — 81025 URINE PREGNANCY TEST: CPT | Performed by: NURSE PRACTITIONER

## 2023-01-04 RX ORDER — NITROFURANTOIN 25; 75 MG/1; MG/1
100 CAPSULE ORAL 2 TIMES DAILY
Qty: 14 CAPSULE | Refills: 0 | Status: SHIPPED | OUTPATIENT
Start: 2023-01-04 | End: 2023-01-11

## 2023-01-04 NOTE — PATIENT INSTRUCTIONS
1. Rest. Drink plenty of fluids. 2. Nitrofurantoin as prescribed. 3. We will send urine culture to lab and call you with results in 3-4 days. At that time we will discuss continuing, stopping, or changing the antibiotic based on the urine culture results. 4. Follow up with PMD in 4-5 days for reeval. Follow up sooner or go to the emergency department immediately if symptoms worsen, change, you develop any fevers, back pain, vomiting, vaginal/pelvic discomfort, any vaginal bleeding/discharge, or if you have any concerns.

## 2023-01-09 RX ORDER — CYCLOBENZAPRINE HCL 10 MG
TABLET ORAL
Qty: 21 TABLET | Refills: 0 | Status: SHIPPED | OUTPATIENT
Start: 2023-01-09

## 2023-01-09 RX ORDER — TRAMADOL HYDROCHLORIDE 50 MG/1
TABLET ORAL
Qty: 21 TABLET | Refills: 0 | Status: SHIPPED | OUTPATIENT
Start: 2023-01-09

## 2023-01-23 DIAGNOSIS — K44.9 HIATAL HERNIA: ICD-10-CM

## 2023-01-24 RX ORDER — TRAMADOL HYDROCHLORIDE 50 MG/1
TABLET ORAL
Qty: 21 TABLET | Refills: 0 | Status: SHIPPED | OUTPATIENT
Start: 2023-01-24

## 2023-01-24 RX ORDER — CYCLOBENZAPRINE HCL 10 MG
TABLET ORAL
Qty: 21 TABLET | Refills: 0 | Status: SHIPPED | OUTPATIENT
Start: 2023-01-24

## 2023-01-24 RX ORDER — OMEPRAZOLE 40 MG/1
CAPSULE, DELAYED RELEASE ORAL
Qty: 90 CAPSULE | Refills: 0 | Status: SHIPPED | OUTPATIENT
Start: 2023-01-24

## 2023-02-23 ENCOUNTER — OFFICE VISIT (OUTPATIENT)
Dept: FAMILY MEDICINE CLINIC | Facility: CLINIC | Age: 43
End: 2023-02-23
Payer: COMMERCIAL

## 2023-02-23 VITALS
BODY MASS INDEX: 32 KG/M2 | WEIGHT: 173 LBS | TEMPERATURE: 99 F | SYSTOLIC BLOOD PRESSURE: 124 MMHG | RESPIRATION RATE: 18 BRPM | DIASTOLIC BLOOD PRESSURE: 78 MMHG | OXYGEN SATURATION: 100 % | HEART RATE: 96 BPM

## 2023-02-23 DIAGNOSIS — M54.40 CHRONIC BILATERAL LOW BACK PAIN WITH SCIATICA, SCIATICA LATERALITY UNSPECIFIED: Primary | ICD-10-CM

## 2023-02-23 DIAGNOSIS — G89.29 CHRONIC BILATERAL LOW BACK PAIN WITH SCIATICA, SCIATICA LATERALITY UNSPECIFIED: Primary | ICD-10-CM

## 2023-02-23 PROCEDURE — 99214 OFFICE O/P EST MOD 30 MIN: CPT | Performed by: INTERNAL MEDICINE

## 2023-02-23 PROCEDURE — 3074F SYST BP LT 130 MM HG: CPT | Performed by: INTERNAL MEDICINE

## 2023-02-23 PROCEDURE — 3078F DIAST BP <80 MM HG: CPT | Performed by: INTERNAL MEDICINE

## 2023-02-23 RX ORDER — TOPIRAMATE 25 MG/1
25 TABLET ORAL 2 TIMES DAILY
COMMUNITY
Start: 2022-12-13

## 2023-02-23 RX ORDER — DULAGLUTIDE 0.75 MG/.5ML
0.75 INJECTION, SOLUTION SUBCUTANEOUS
COMMUNITY
Start: 2023-02-09

## 2023-02-23 RX ORDER — TRAMADOL HYDROCHLORIDE 50 MG/1
50 TABLET ORAL EVERY 8 HOURS PRN
Qty: 21 TABLET | Refills: 2 | Status: SHIPPED | OUTPATIENT
Start: 2023-02-23

## 2023-02-23 RX ORDER — CYCLOBENZAPRINE HCL 10 MG
10 TABLET ORAL 3 TIMES DAILY PRN
Qty: 21 TABLET | Refills: 3 | Status: SHIPPED | OUTPATIENT
Start: 2023-02-23

## 2023-03-23 RX ORDER — TRAMADOL HYDROCHLORIDE 50 MG/1
TABLET ORAL
Qty: 21 TABLET | Refills: 0 | Status: SHIPPED | OUTPATIENT
Start: 2023-03-23

## 2023-03-23 NOTE — TELEPHONE ENCOUNTER
traMADol 50 MG Oral Tab   Last office visit: 2/23/23  Future Appointments   Date Time Provider Lloyd Glynn   3/24/2023 11:45 AM Henry Lagunas MD [facility]     Last filled: 2/23/23  #21 with 2 refills  Last labs: 3/23/22

## 2023-03-24 ENCOUNTER — OFFICE VISIT (OUTPATIENT)
Facility: LOCATION | Age: 43
End: 2023-03-24
Payer: COMMERCIAL

## 2023-03-24 DIAGNOSIS — J31.0 RHINITIS, NON-ALLERGIC: Primary | ICD-10-CM

## 2023-03-24 PROCEDURE — 99214 OFFICE O/P EST MOD 30 MIN: CPT | Performed by: OTOLARYNGOLOGY

## 2023-03-24 RX ORDER — IPRATROPIUM BROMIDE 42 UG/1
2 SPRAY, METERED NASAL 2 TIMES DAILY
Qty: 1 EACH | Refills: 5 | Status: SHIPPED | OUTPATIENT
Start: 2023-03-24

## 2023-03-30 RX ORDER — CYCLOBENZAPRINE HCL 10 MG
TABLET ORAL
Qty: 21 TABLET | Refills: 3 | Status: SHIPPED | OUTPATIENT
Start: 2023-03-30

## 2023-03-31 RX ORDER — TRAMADOL HYDROCHLORIDE 50 MG/1
TABLET ORAL
Qty: 21 TABLET | Refills: 0 | Status: SHIPPED | OUTPATIENT
Start: 2023-03-31

## 2023-03-31 NOTE — TELEPHONE ENCOUNTER
Tramadol 50 MG oral tab    Last office visit: 2/23/23   No future appointments.   Last filled: 3/23/23 #21 with 0 refills  Last labs: 3/23/22

## 2023-04-06 DIAGNOSIS — K44.9 HIATAL HERNIA: ICD-10-CM

## 2023-04-06 RX ORDER — OMEPRAZOLE 40 MG/1
CAPSULE, DELAYED RELEASE ORAL
Qty: 90 CAPSULE | Refills: 0 | Status: SHIPPED | OUTPATIENT
Start: 2023-04-06

## 2023-04-13 NOTE — TELEPHONE ENCOUNTER
No Protocol    Tramadol 50 G oral tab    Last office visit:  2/23/23  No future appointments.   Last filled: 3/31/23  #21 with 0 refills  Last labs: 3/23/22

## 2023-04-18 RX ORDER — TRAMADOL HYDROCHLORIDE 50 MG/1
TABLET ORAL
Qty: 21 TABLET | Refills: 0 | Status: SHIPPED | OUTPATIENT
Start: 2023-04-18

## 2023-04-25 RX ORDER — TRAMADOL HYDROCHLORIDE 50 MG/1
TABLET ORAL
Qty: 21 TABLET | Refills: 0 | Status: SHIPPED | OUTPATIENT
Start: 2023-04-25

## 2023-04-25 NOTE — TELEPHONE ENCOUNTER
Tramadol 50 MG Oral tab    Last office visit: 2/23/23   Future Appointments   Date Time Provider Lloyd Glynn   4/27/2023  1:45 PM Anoop Chamorro MD [facility]     Last filled:  4/18/23  #21 with 0 refills   Last labs:

## 2023-04-27 ENCOUNTER — OFFICE VISIT (OUTPATIENT)
Dept: FAMILY MEDICINE CLINIC | Facility: CLINIC | Age: 43
End: 2023-04-27
Payer: COMMERCIAL

## 2023-04-27 VITALS
RESPIRATION RATE: 18 BRPM | SYSTOLIC BLOOD PRESSURE: 118 MMHG | OXYGEN SATURATION: 100 % | HEART RATE: 101 BPM | WEIGHT: 168 LBS | DIASTOLIC BLOOD PRESSURE: 76 MMHG | TEMPERATURE: 99 F | BODY MASS INDEX: 31 KG/M2

## 2023-04-27 DIAGNOSIS — F43.9 STRESS: ICD-10-CM

## 2023-04-27 DIAGNOSIS — G89.29 CHRONIC BILATERAL LOW BACK PAIN WITH SCIATICA, SCIATICA LATERALITY UNSPECIFIED: Primary | ICD-10-CM

## 2023-04-27 DIAGNOSIS — M54.40 CHRONIC BILATERAL LOW BACK PAIN WITH SCIATICA, SCIATICA LATERALITY UNSPECIFIED: Primary | ICD-10-CM

## 2023-04-27 PROCEDURE — 99214 OFFICE O/P EST MOD 30 MIN: CPT | Performed by: INTERNAL MEDICINE

## 2023-04-27 PROCEDURE — 3074F SYST BP LT 130 MM HG: CPT | Performed by: INTERNAL MEDICINE

## 2023-04-27 PROCEDURE — 3078F DIAST BP <80 MM HG: CPT | Performed by: INTERNAL MEDICINE

## 2023-05-03 NOTE — TELEPHONE ENCOUNTER
TRAMADOL 50 MG Oral Tab     LOV  4-27-23     LAST RX  4-25-23  #21    Next OV  No future appointments. Follow up call to patient is taking 2-3 tabs a day for back pain. Per pt is helping and feels issue is improving.  Will be out in a day or two and would like refill

## 2023-05-04 RX ORDER — TRAMADOL HYDROCHLORIDE 50 MG/1
TABLET ORAL
Qty: 21 TABLET | Refills: 0 | Status: SHIPPED | OUTPATIENT
Start: 2023-05-04

## 2023-05-10 RX ORDER — TRAMADOL HYDROCHLORIDE 50 MG/1
TABLET ORAL
Qty: 21 TABLET | Refills: 0 | OUTPATIENT
Start: 2023-05-10

## 2023-05-10 NOTE — TELEPHONE ENCOUNTER
Tramadol 50 MG oral tab    Last office visit: 4/27/23     No future appointments.   Last filled: 5/4/23   #21  With 0 refills   Last labs:  0/93/34     Duplicate request sent to [facility]

## 2023-05-18 RX ORDER — TRAMADOL HYDROCHLORIDE 50 MG/1
50 TABLET ORAL EVERY 8 HOURS PRN
Qty: 21 TABLET | Refills: 0 | Status: SHIPPED | OUTPATIENT
Start: 2023-05-18

## 2023-05-26 ENCOUNTER — HOSPITAL ENCOUNTER (OUTPATIENT)
Dept: GENERAL RADIOLOGY | Age: 43
Discharge: HOME | End: 2023-05-26
Attending: INTERNAL MEDICINE
Payer: COMMERCIAL

## 2023-05-26 DIAGNOSIS — M54.50 CHRONIC BILATERAL LOW BACK PAIN WITHOUT SCIATICA: ICD-10-CM

## 2023-05-26 DIAGNOSIS — G89.29 CHRONIC BILATERAL LOW BACK PAIN WITHOUT SCIATICA: ICD-10-CM

## 2023-05-26 DIAGNOSIS — M54.2 CHRONIC NECK PAIN: ICD-10-CM

## 2023-05-26 DIAGNOSIS — G89.29 CHRONIC NECK PAIN: ICD-10-CM

## 2023-05-26 PROCEDURE — 72040 X-RAY EXAM NECK SPINE 2-3 VW: CPT | Performed by: INTERNAL MEDICINE

## 2023-05-26 PROCEDURE — 72110 X-RAY EXAM L-2 SPINE 4/>VWS: CPT | Performed by: INTERNAL MEDICINE

## 2023-05-27 ENCOUNTER — PATIENT MESSAGE (OUTPATIENT)
Dept: FAMILY MEDICINE CLINIC | Facility: CLINIC | Age: 43
End: 2023-05-27

## 2023-06-03 RX ORDER — TRAMADOL HYDROCHLORIDE 50 MG/1
50 TABLET ORAL EVERY 8 HOURS PRN
Qty: 21 TABLET | Refills: 0 | Status: SHIPPED | OUTPATIENT
Start: 2023-06-03

## 2023-06-12 RX ORDER — TRAMADOL HYDROCHLORIDE 50 MG/1
50 TABLET ORAL EVERY 8 HOURS PRN
Qty: 21 TABLET | Refills: 0 | Status: SHIPPED | OUTPATIENT
Start: 2023-06-12

## 2023-06-19 RX ORDER — CYCLOBENZAPRINE HCL 10 MG
TABLET ORAL
Qty: 21 TABLET | Refills: 3 | Status: SHIPPED | OUTPATIENT
Start: 2023-06-19

## 2023-06-29 DIAGNOSIS — K44.9 HIATAL HERNIA: ICD-10-CM

## 2023-07-03 NOTE — TELEPHONE ENCOUNTER
Omeprazole 40 MG Oral Capsule Delayed Release     Last office visit:  4/27/23    No future appointments.   Last filled:  4/6/23  #90 with 0 refills   Last labs:  Romana Grossman labs: 3/23/22

## 2023-07-05 RX ORDER — OMEPRAZOLE 40 MG/1
40 CAPSULE, DELAYED RELEASE ORAL EVERY MORNING
Qty: 90 CAPSULE | Refills: 0 | Status: SHIPPED | OUTPATIENT
Start: 2023-07-05

## 2023-07-17 RX ORDER — TRAMADOL HYDROCHLORIDE 50 MG/1
50 TABLET ORAL EVERY 8 HOURS PRN
Qty: 21 TABLET | Refills: 0 | Status: SHIPPED | OUTPATIENT
Start: 2023-07-17

## 2023-07-17 NOTE — TELEPHONE ENCOUNTER
traMADol 50 MG Oral Tab     LOV  4-27-23    LAST RX  7-5-23  #21    Next OV  No future appointments.

## 2023-07-29 RX ORDER — TRAMADOL HYDROCHLORIDE 50 MG/1
50 TABLET ORAL EVERY 8 HOURS PRN
Qty: 21 TABLET | Refills: 0 | Status: SHIPPED | OUTPATIENT
Start: 2023-07-29

## 2023-07-29 RX ORDER — CYCLOBENZAPRINE HCL 10 MG
10 TABLET ORAL 3 TIMES DAILY PRN
Qty: 21 TABLET | Refills: 3 | Status: SHIPPED | OUTPATIENT
Start: 2023-07-29

## 2023-07-29 NOTE — TELEPHONE ENCOUNTER
Last office visit: 4/27/23  Last refill: Tramadol: 7/17/23 qty 21, Cyclobenzaprine: 6/19/23  No future appointments.

## 2023-08-05 RX ORDER — TRAMADOL HYDROCHLORIDE 50 MG/1
50 TABLET ORAL EVERY 8 HOURS PRN
Qty: 21 TABLET | Refills: 0 | Status: SHIPPED | OUTPATIENT
Start: 2023-08-05

## 2023-08-05 RX ORDER — CYCLOBENZAPRINE HCL 10 MG
10 TABLET ORAL 3 TIMES DAILY PRN
Qty: 21 TABLET | Refills: 3 | Status: SHIPPED | OUTPATIENT
Start: 2023-08-05

## 2023-08-05 NOTE — TELEPHONE ENCOUNTER
Last office visit:  4/27/23    No future appointments.   Last labs:  9/8/21    Cyclobenzaprine 10 MG oral tab  Last filled:  7/29/23  #21 with 3 refills     Tramadol 50 mg oral tab  Last filled:  #21 with 0 refills

## 2023-08-14 NOTE — TELEPHONE ENCOUNTER
LOV:4/27/23  LAST LAB:3/23/22  LAST RX:   Disp Refills Start End    traMADol 50 MG Oral Tab 21 tablet 0 8/5/2023     Sig - Route: Take 1 tablet (50 mg total) by mouth every 8 (eight) hours as needed for Pain. - Oral             cyclobenzaprine 10 MG Oral Tab 21 tablet 3 8/5/2023    Sig:   Take 1 tablet (10 mg total) by mouth 3 (three) times daily as needed for Muscle spasms. Next OV: No future appointments.    Chana Frausto

## 2023-08-15 RX ORDER — TRAMADOL HYDROCHLORIDE 50 MG/1
50 TABLET ORAL EVERY 8 HOURS PRN
Qty: 21 TABLET | Refills: 0 | Status: SHIPPED | OUTPATIENT
Start: 2023-08-15

## 2023-08-15 RX ORDER — CYCLOBENZAPRINE HCL 10 MG
10 TABLET ORAL 3 TIMES DAILY PRN
Qty: 21 TABLET | Refills: 3 | Status: SHIPPED | OUTPATIENT
Start: 2023-08-15

## 2023-08-15 RX ORDER — TRAMADOL HYDROCHLORIDE 50 MG/1
50 TABLET ORAL EVERY 8 HOURS PRN
Qty: 21 TABLET | Refills: 0 | OUTPATIENT
Start: 2023-08-15

## 2023-08-15 NOTE — TELEPHONE ENCOUNTER
Last refill:  08/15/23  Qty 21 tabs  W/ 0 refills  Last ov: 04/27/23    Requested Prescriptions     Pending Prescriptions Disp Refills    TRAMADOL 50 MG Oral Tab [Pharmacy Med Name: TRAMADOL 50MG TABLETS] 21 tablet 0     Sig: TAKE 1 TABLET(50 MG) BY MOUTH EVERY 8 HOURS AS NEEDED FOR PAIN     No future appointments.

## 2023-09-08 RX ORDER — TRAMADOL HYDROCHLORIDE 50 MG/1
50 TABLET ORAL EVERY 8 HOURS PRN
Qty: 21 TABLET | Refills: 0 | OUTPATIENT
Start: 2023-09-08

## 2023-09-13 ENCOUNTER — OFFICE VISIT (OUTPATIENT)
Dept: FAMILY MEDICINE CLINIC | Facility: CLINIC | Age: 43
End: 2023-09-13
Payer: COMMERCIAL

## 2023-09-13 VITALS
HEART RATE: 101 BPM | DIASTOLIC BLOOD PRESSURE: 78 MMHG | TEMPERATURE: 98 F | WEIGHT: 174 LBS | BODY MASS INDEX: 32 KG/M2 | RESPIRATION RATE: 18 BRPM | OXYGEN SATURATION: 98 % | SYSTOLIC BLOOD PRESSURE: 122 MMHG

## 2023-09-13 DIAGNOSIS — M54.50 ACUTE MIDLINE LOW BACK PAIN WITHOUT SCIATICA: ICD-10-CM

## 2023-09-13 DIAGNOSIS — Z13.9 SCREENING DUE: Primary | ICD-10-CM

## 2023-09-13 PROCEDURE — 3078F DIAST BP <80 MM HG: CPT | Performed by: INTERNAL MEDICINE

## 2023-09-13 PROCEDURE — 99214 OFFICE O/P EST MOD 30 MIN: CPT | Performed by: INTERNAL MEDICINE

## 2023-09-13 PROCEDURE — 3074F SYST BP LT 130 MM HG: CPT | Performed by: INTERNAL MEDICINE

## 2023-09-13 RX ORDER — TRAMADOL HYDROCHLORIDE 50 MG/1
50 TABLET ORAL EVERY 8 HOURS PRN
Qty: 21 TABLET | Refills: 0 | Status: SHIPPED | OUTPATIENT
Start: 2023-09-13

## 2023-09-13 RX ORDER — LANSOPRAZOLE 30 MG/1
1 CAPSULE, DELAYED RELEASE ORAL
COMMUNITY
Start: 2023-09-06

## 2023-09-22 ENCOUNTER — HOSPITAL ENCOUNTER (OUTPATIENT)
Dept: MAMMOGRAPHY | Age: 43
Discharge: HOME | End: 2023-09-22
Attending: INTERNAL MEDICINE
Payer: COMMERCIAL

## 2023-09-22 DIAGNOSIS — Z13.9 SCREENING DUE: ICD-10-CM

## 2023-09-22 PROCEDURE — 77067 SCR MAMMO BI INCL CAD: CPT | Performed by: INTERNAL MEDICINE

## 2023-09-22 PROCEDURE — 77063 BREAST TOMOSYNTHESIS BI: CPT | Performed by: INTERNAL MEDICINE

## 2023-11-27 DIAGNOSIS — K44.9 HIATAL HERNIA: ICD-10-CM

## 2023-11-28 RX ORDER — OMEPRAZOLE 40 MG/1
40 CAPSULE, DELAYED RELEASE ORAL EVERY MORNING
Qty: 90 CAPSULE | Refills: 0 | Status: SHIPPED | OUTPATIENT
Start: 2023-11-28

## 2023-11-28 NOTE — TELEPHONE ENCOUNTER
Last refill: 07/05/23  Qty: 90  W/ 0 refills  Last ov: 09/13/23    Requested Prescriptions     Pending Prescriptions Disp Refills    OMEPRAZOLE 40 MG Oral Capsule Delayed Release [Pharmacy Med Name: OMEPRAZOLE 40MG CAPSULES] 90 capsule 0     Sig: TAKE 1 CAPSULE(40 MG) BY MOUTH EVERY MORNING     Future Appointments   Date Time Provider Lloyd Glynn   12/8/2023  2:15 PM [facility] MR RM3 (3T WIDE) [facility] MRI [employer]

## 2023-12-08 ENCOUNTER — HOSPITAL ENCOUNTER (OUTPATIENT)
Dept: MRI IMAGING | Facility: HOSPITAL | Age: 43
Discharge: HOME | End: 2023-12-08
Attending: INTERNAL MEDICINE
Payer: COMMERCIAL

## 2023-12-08 DIAGNOSIS — M54.50 ACUTE MIDLINE LOW BACK PAIN WITHOUT SCIATICA: ICD-10-CM

## 2023-12-08 PROCEDURE — 72148 MRI LUMBAR SPINE W/O DYE: CPT | Performed by: INTERNAL MEDICINE

## 2024-02-08 DIAGNOSIS — K44.9 HIATAL HERNIA: ICD-10-CM

## 2024-02-08 RX ORDER — OMEPRAZOLE 40 MG/1
40 CAPSULE, DELAYED RELEASE ORAL EVERY MORNING
Qty: 90 CAPSULE | Refills: 0 | Status: SHIPPED | OUTPATIENT
Start: 2024-02-08

## 2024-02-08 NOTE — TELEPHONE ENCOUNTER
Last refill: 11/28/23  qtY: 90  W/ 0 refills  Last ov: 09/13/23    Requested Prescriptions     Pending Prescriptions Disp Refills    Omeprazole 40 MG Oral Capsule Delayed Release 90 capsule 0     Sig: Take 1 capsule (40 mg total) by mouth every morning.     Future Appointments   Date Time Provider Department Center   2/27/2024  9:45 AM Jameson Sosa MD [id number]

## 2024-02-27 ENCOUNTER — OFFICE VISIT (OUTPATIENT)
Facility: LOCATION | Age: 44
End: 2024-02-27
Payer: COMMERCIAL

## 2024-02-27 DIAGNOSIS — J31.0 RHINITIS, NON-ALLERGIC: Primary | ICD-10-CM

## 2024-02-27 PROCEDURE — 99214 OFFICE O/P EST MOD 30 MIN: CPT | Performed by: OTOLARYNGOLOGY

## 2024-02-27 NOTE — PROGRESS NOTES
Katty Weeks is a 43 year old female. No chief complaint on file.    HPI:   43-year-old white female have seen for nonallergic rhinitis treated with Atrovent nasal spray improvement noted she did wanted to inquire into the Clarifix procedure.  Current Outpatient Medications   Medication Sig Dispense Refill    Omeprazole 40 MG Oral Capsule Delayed Release Take 1 capsule (40 mg total) by mouth every morning. 90 capsule 0    lansoprazole 30 MG Oral Capsule Delayed Release Take 1 capsule (30 mg total) by mouth before breakfast. (Patient not taking: Reported on 9/13/2023)      traMADol 50 MG Oral Tab Take 1 tablet (50 mg total) by mouth every 8 (eight) hours as needed for Pain. 21 tablet 0    TRAMADOL 50 MG Oral Tab TAKE 1 TABLET(50 MG) BY MOUTH EVERY 8 HOURS AS NEEDED FOR PAIN (Patient not taking: Reported on 9/13/2023) 21 tablet 0    cyclobenzaprine 10 MG Oral Tab Take 1 tablet (10 mg total) by mouth 3 (three) times daily as needed for Muscle spasms. 21 tablet 3    ipratropium 0.06 % Nasal Solution 2 sprays by Nasal route in the morning and 2 sprays before bedtime. 1 each 5    TRULICITY 0.75 MG/0.5ML Subcutaneous Solution Pen-injector Inject 0.75 mg into the skin every 7 days.      Lisdexamfetamine Dimesylate (VYVANSE) 60 MG Oral Cap Take 1 capsule (60 mg total) by mouth every morning. 30 capsule 0    ONDANSETRON 4 MG Oral Tablet Dispersible DISSOLVE 1 TABLET(4 MG) ON THE TONGUE EVERY 4 HOURS AS NEEDED FOR NAUSEA 20 tablet 0      Past Medical History:   Diagnosis Date    Abdominal hernia     Anxiety     Depression     Esophageal reflux     Hiatal hernia     High cholesterol     History of depression     Indigestion     EMILIANO (obstructive sleep apnea) 10/01/2020 HST    AHI 7 Supine AHI 7 Non-supine AHI 7 O2 Matheus 88%    Sleep apnea     CPAP    Vomiting     Wears glasses       Social History:  Social History     Socioeconomic History    Marital status: Single   Tobacco Use    Smoking status: Never    Smokeless  tobacco: Never    Tobacco comments:     Non-smoker   Vaping Use    Vaping Use: Never used   Substance and Sexual Activity    Alcohol use: No     Alcohol/week: 0.0 standard drinks of alcohol    Drug use: No   Other Topics Concern    Caffeine Concern Yes     Comment: 2 drinks/day    Weight Concern Yes    Special Diet No    Exercise Yes     Comment: 3 times/week    Seat Belt Yes      Past Surgical History:   Procedure Laterality Date    BREAST BIOPSY Right     bengin    JOJO LOCALIZATION WIRE 1 SITE RIGHT (CPT=19281) Right     2x cysts removed about 20 years ago    OTHER SURGICAL HISTORY      nasal septum         REVIEW OF SYSTEMS:   GENERAL HEALTH: feels well otherwise  GENERAL : denies fever, chills, sweats, weight loss, weight gain  SKIN: denies any unusual skin lesions or rashes  RESPIRATORY: denies shortness of breath with exertion  NEURO: denies headaches    EXAM:   LMP 09/04/2023 (Exact Date)     System Pertinent findings Details   Constitutional  Overall appearance - Normal.   Head/Face  Facial features -- Normal. Skull - Normal.   Eyes  Pupils equal ,round ,react to light and accomidate   Ears  External Ear Right: Normal, Left: Normal. Canal - Right: Normal, Left: Normal. TM - Right: Normal left: Normal   Nose  External Nose, Normal, Septum -midline,Nasal Vault, clear. Turbinates - Right: Boggy left: Boggy   Mouth/Throat  Lips/teeth/gums - Normal. Tonsils -1+ oropharynx - Normal.   Neck Exam  Inspection - Normal. Palpation - Normal. Parotid gland - Normal. Thyroid gland -normal   Lymph Detail  Submental. Submandibular. Anterior cervical. Posterior cervical. Supraclavicular.       ASSESSMENT AND PLAN:   1. Rhinitis, non-allergic  Discussed Clarifix procedure in detail recommending this as a treatment patient setting up surgical date      The patient indicates understanding of these issues and agrees to the plan.      Jameson Sosa MD  2/27/2024  12:10 PM

## 2024-04-22 ENCOUNTER — TELEPHONE (OUTPATIENT)
Facility: LOCATION | Age: 44
End: 2024-04-22

## 2024-04-22 DIAGNOSIS — J31.0 CHRONIC RHINITIS: Primary | ICD-10-CM

## 2024-04-22 RX ORDER — IPRATROPIUM BROMIDE 42 UG/1
2 SPRAY, METERED NASAL 2 TIMES DAILY
Qty: 1 EACH | Refills: 5 | Status: SHIPPED | OUTPATIENT
Start: 2024-04-22

## 2024-04-22 RX ORDER — DIAZEPAM 5 MG/1
TABLET ORAL
Qty: 3 TABLET | Refills: 0 | Status: SHIPPED | OUTPATIENT
Start: 2024-04-22

## 2024-04-26 ENCOUNTER — OFFICE VISIT (OUTPATIENT)
Facility: LOCATION | Age: 44
End: 2024-04-26
Payer: COMMERCIAL

## 2024-04-26 DIAGNOSIS — J31.0 RHINITIS, NON-ALLERGIC: Primary | ICD-10-CM

## 2024-04-26 RX ORDER — ACETAMINOPHEN AND CODEINE PHOSPHATE 300; 30 MG/1; MG/1
1 TABLET ORAL EVERY 4 HOURS PRN
Qty: 20 TABLET | Refills: 1 | Status: SHIPPED | OUTPATIENT
Start: 2024-04-26

## 2024-04-26 NOTE — PROGRESS NOTES
In office Clarifix procedure    Ablation of intranasal lesion- right (75177)    Ablation of intranasal lesion-left (40824-OC)    Patient was brought into the clinic treatment room.  she was first topically anesthetized with topical anesthetic spray.  Then using the 0 degree sinus scope further anesthesia was given by injection of 1% Xylocaine with 1 100,000 units of epinephrine into the posterior nasal region adjacent to the attachment of the middle turbinate.  The Clarifix apparatus was then placed deep into the left portion of the nose.  In the area where we had previously anesthetized.  Activated for 30 seconds then deactivated for another 30 seconds.  The process was repeated a second time on the left side after adjusting the apparatus more caudally.  The exact same procedure was then performed on the right side.  Patient tolerated the procedure well.  Was given instructions for saline irrigations.

## 2024-04-29 DIAGNOSIS — K44.9 HIATAL HERNIA: ICD-10-CM

## 2024-04-30 RX ORDER — OMEPRAZOLE 40 MG/1
40 CAPSULE, DELAYED RELEASE ORAL EVERY MORNING
Qty: 90 CAPSULE | Refills: 0 | Status: SHIPPED | OUTPATIENT
Start: 2024-04-30

## 2024-04-30 NOTE — TELEPHONE ENCOUNTER
Last refill: 02/08/24  Qty: 90  W/ 0 refills  Last ov: 09/13/23    Requested Prescriptions     Pending Prescriptions Disp Refills    OMEPRAZOLE 40 MG Oral Capsule Delayed Release [Pharmacy Med Name: OMEPRAZOLE 40MG CAPSULES] 90 capsule 0     Sig: TAKE 1 CAPSULE(40 MG) BY MOUTH EVERY MORNING     Future Appointments   Date Time Provider Department Center   6/7/2024 11:00 AM Jameson Sosa MD [id number]

## 2024-06-07 ENCOUNTER — OFFICE VISIT (OUTPATIENT)
Facility: LOCATION | Age: 44
End: 2024-06-07
Payer: COMMERCIAL

## 2024-06-07 DIAGNOSIS — J31.0 RHINITIS, NON-ALLERGIC: Primary | ICD-10-CM

## 2024-06-07 PROCEDURE — 99214 OFFICE O/P EST MOD 30 MIN: CPT | Performed by: OTOLARYNGOLOGY

## 2024-06-07 NOTE — PROGRESS NOTES
Katty Weeks is a 43 year old female. No chief complaint on file.    HPI:   3-year-old white female had an office Clarifix procedure performed 4/26/2024 slight improvement noted although she feels like seasonal allergies are acting up she would give it another 6 to 8 weeks follow-up if not improved  Current Outpatient Medications   Medication Sig Dispense Refill    Omeprazole 40 MG Oral Capsule Delayed Release Take 1 capsule (40 mg total) by mouth every morning. 90 capsule 0    acetaminophen-codeine (TYLENOL WITH CODEINE #3) 300-30 MG Oral Tab Take 1 tablet by mouth every 4 (four) hours as needed for Pain. 20 tablet 1    diazePAM 5 MG Oral Tab Take 1 tablet by mouth 1 hour prior to procedure, bring rest to procedure. 3 tablet 0    ipratropium 0.06 % Nasal Solution 2 sprays by Nasal route in the morning and 2 sprays before bedtime. 1 each 5    lansoprazole 30 MG Oral Capsule Delayed Release Take 1 capsule (30 mg total) by mouth before breakfast. (Patient not taking: Reported on 9/13/2023)      traMADol 50 MG Oral Tab Take 1 tablet (50 mg total) by mouth every 8 (eight) hours as needed for Pain. 21 tablet 0    TRAMADOL 50 MG Oral Tab TAKE 1 TABLET(50 MG) BY MOUTH EVERY 8 HOURS AS NEEDED FOR PAIN (Patient not taking: Reported on 9/13/2023) 21 tablet 0    cyclobenzaprine 10 MG Oral Tab Take 1 tablet (10 mg total) by mouth 3 (three) times daily as needed for Muscle spasms. 21 tablet 3    TRULICITY 0.75 MG/0.5ML Subcutaneous Solution Pen-injector Inject 0.75 mg into the skin every 7 days.      Lisdexamfetamine Dimesylate (VYVANSE) 60 MG Oral Cap Take 1 capsule (60 mg total) by mouth every morning. 30 capsule 0    ONDANSETRON 4 MG Oral Tablet Dispersible DISSOLVE 1 TABLET(4 MG) ON THE TONGUE EVERY 4 HOURS AS NEEDED FOR NAUSEA 20 tablet 0      Past Medical History:    Abdominal hernia    Anxiety    Depression    Esophageal reflux    Hiatal hernia    High cholesterol    History of depression    Indigestion     EMILIANO (obstructive sleep apnea)    AHI 7 Supine AHI 7 Non-supine AHI 7 O2 Matheus 88%    Sleep apnea    CPAP    Vomiting    Wears glasses      Social History:  Social History     Socioeconomic History    Marital status: Single   Tobacco Use    Smoking status: Never    Smokeless tobacco: Never    Tobacco comments:     Non-smoker   Vaping Use    Vaping status: Never Used   Substance and Sexual Activity    Alcohol use: No     Alcohol/week: 0.0 standard drinks of alcohol    Drug use: No   Other Topics Concern    Caffeine Concern Yes     Comment: 2 drinks/day    Weight Concern Yes    Special Diet No    Exercise Yes     Comment: 3 times/week    Seat Belt Yes     Social Determinants of Health     Food Insecurity: Low Risk  (11/18/2021)    Received from [facility]    Food Insecurity     Have there been times that your food ran out, and you didn't have money to get more?: No     Are there times that you worry that this might happen?: No   Transportation Needs: Low Risk  (11/18/2021)    Received from [facility]    Transportation Needs     Do you have trouble getting transportation to medical appointments?: No    [facility]    Social Connections   Housing Stability: Low Risk  (11/18/2021)    Received from [facility]    Housing Stability     Are you concerned about having a safe and reliable place to live?: No      Past Surgical History:   Procedure Laterality Date    Breast biopsy Right     bengin    Max localization wire 1 site right (cpt=19281) Right     2x cysts removed about 20 years ago    Other surgical history      nasal septum         REVIEW OF SYSTEMS:   GENERAL HEALTH: feels well otherwise  GENERAL : denies fever, chills, sweats, weight loss, weight gain  SKIN: denies any unusual skin  lesions or rashes  RESPIRATORY: denies shortness of breath with exertion  NEURO: denies headaches    EXAM:   LMP 09/04/2023 (Exact Date)     System Pertinent findings Details   Constitutional  Overall appearance - Normal.   Head/Face  Facial features -- Normal. Skull - Normal.   Eyes  Pupils equal ,round ,react to light and accomidate   Ears  External Ear Right: Normal, Left: Normal. Canal - Right: Normal, Left: Normal. TM - Right: Normal left: Normal   Nose  External Nose, Normal, Septum -midline,Nasal Vault, clear. Turbinates - Right: Normal left: Normal   Mouth/Throat  Lips/teeth/gums - Normal. Tonsils -1+ oropharynx - Normal.   Neck Exam  Inspection - Normal. Palpation - Normal. Parotid gland - Normal. Thyroid gland -normal   Lymph Detail  Submental. Submandibular. Anterior cervical. Posterior cervical. Supraclavicular.       ASSESSMENT AND PLAN:   1. Rhinitis, non-allergic  Follow-up 6 to 8 weeks time for check      The patient indicates understanding of these issues and agrees to the plan.      Jameson Sosa MD  6/7/2024  12:21 PM

## 2024-07-14 DIAGNOSIS — K44.9 HIATAL HERNIA: ICD-10-CM

## 2024-07-15 RX ORDER — OMEPRAZOLE 40 MG/1
40 CAPSULE, DELAYED RELEASE ORAL EVERY MORNING
Qty: 90 CAPSULE | Refills: 0 | Status: SHIPPED | OUTPATIENT
Start: 2024-07-15

## 2024-07-15 NOTE — TELEPHONE ENCOUNTER
Last refill: 04/30/24  Qty: 90  W/ 0 refills  Last ov: 09/13/23    Requested Prescriptions     Pending Prescriptions Disp Refills    OMEPRAZOLE 40 MG Oral Capsule Delayed Release [Pharmacy Med Name: OMEPRAZOLE 40MG CAPSULES] 90 capsule 0     Sig: TAKE 1 CAPSULE(40 MG) BY MOUTH EVERY MORNING     No future appointments.

## 2024-09-23 DIAGNOSIS — K44.9 HIATAL HERNIA: ICD-10-CM

## 2024-09-24 RX ORDER — OMEPRAZOLE 40 MG/1
40 CAPSULE, DELAYED RELEASE ORAL EVERY MORNING
Qty: 90 CAPSULE | Refills: 0 | Status: SHIPPED | OUTPATIENT
Start: 2024-09-24

## 2024-09-24 NOTE — TELEPHONE ENCOUNTER
Last office visit:  11/8/21  No future appointments.  OMEPRAZOLE 40 MG Oral Capsule Delayed Release     Gastrointestional Medication Protocol [id number]/2024 06:53 PM   Protocol Details In person appointment or virtual visit in the past 12 mos or appointment in next 3 mos      Last filled:  7/15/24  #90  Last labs:  9/8/21    Streamfile message sent that she is due for a physical and labs.

## 2024-12-22 DIAGNOSIS — K44.9 HIATAL HERNIA: ICD-10-CM

## 2024-12-23 RX ORDER — OMEPRAZOLE 40 MG/1
40 CAPSULE, DELAYED RELEASE ORAL EVERY MORNING
Qty: 90 CAPSULE | Refills: 0 | OUTPATIENT
Start: 2024-12-23

## 2025-01-30 ENCOUNTER — OFFICE VISIT (OUTPATIENT)
Dept: FAMILY MEDICINE CLINIC | Facility: CLINIC | Age: 45
End: 2025-01-30
Payer: COMMERCIAL

## 2025-01-30 VITALS
SYSTOLIC BLOOD PRESSURE: 124 MMHG | DIASTOLIC BLOOD PRESSURE: 72 MMHG | TEMPERATURE: 100 F | WEIGHT: 153.38 LBS | RESPIRATION RATE: 18 BRPM | BODY MASS INDEX: 28 KG/M2 | OXYGEN SATURATION: 99 % | HEART RATE: 69 BPM

## 2025-01-30 DIAGNOSIS — Z00.00 WELL ADULT EXAM: Primary | ICD-10-CM

## 2025-01-30 RX ORDER — ESCITALOPRAM OXALATE 10 MG/1
10 TABLET ORAL DAILY
COMMUNITY
Start: 2024-08-20

## 2025-01-30 RX ORDER — TIRZEPATIDE 5 MG/.5ML
5 INJECTION, SOLUTION SUBCUTANEOUS WEEKLY
COMMUNITY
Start: 2024-07-18

## 2025-01-30 RX ORDER — HYDROXYZINE HYDROCHLORIDE 10 MG/1
1-2 TABLET, FILM COATED ORAL 2 TIMES DAILY PRN
COMMUNITY
Start: 2024-07-26

## 2025-01-30 RX ORDER — PREDNISONE 20 MG/1
20 TABLET ORAL DAILY
Qty: 7 TABLET | Refills: 0 | Status: SHIPPED | OUTPATIENT
Start: 2025-01-30 | End: 2025-02-06

## 2025-01-30 NOTE — PROGRESS NOTES
HPI:   Katty Weeks is a 44 year old female who presents for a complete physical exam. Symptoms: denies discharge, itching, burning or dysuria, rock, regular periods . Patient complains of congestion and cough.  Anxiety; she is getting adequate help.  She had 4 surgical sites this year for skin revision after weight loss surgery.   She has some lingering cough and sinus pressure after a viral infection that went through the office.    Immunization History   Administered Date(s) Administered    DTAP 05/20/2008    DTP 12/11/1981, 02/11/1982, 04/12/1982, 04/07/1983, 07/15/1986    HEP B, Ped/Adol 07/19/1995, 12/04/1997, 06/18/1998    HPV (Gardasil) 12/28/2007, 02/28/2008, 05/20/2008    Hep B, Unspecified Formulation 05/20/2008    MMR 01/10/1983, 07/22/1991    OPV 12/11/1981, 02/11/1982, 04/12/1982, 04/07/1983, 07/15/1986    Td 07/19/1995     Wt Readings from Last 6 Encounters:   01/30/25 153 lb 6 oz (69.6 kg)   09/13/23 174 lb (78.9 kg)   04/27/23 168 lb (76.2 kg)   02/23/23 173 lb (78.5 kg)   01/04/23 160 lb (72.6 kg)   11/28/22 171 lb 6 oz (77.7 kg)     Body mass index is 28.05 kg/m².     Lab Results   Component Value Date    GLU 84 03/23/2022    GLU 84 09/08/2021    GLU 87 10/14/2020     Lab Results   Component Value Date    CHOLEST 186 03/23/2022    CHOLEST 229 (H) 09/08/2021    CHOLEST 181 10/14/2020     Lab Results   Component Value Date    HDL 31 (L) 03/23/2022    HDL 34 (L) 09/08/2021    HDL 28 (L) 10/14/2020     Lab Results   Component Value Date     (H) 03/23/2022     (H) 09/08/2021    LDL 94 10/14/2020     Lab Results   Component Value Date    AST 8 (L) 09/08/2021    AST 13 (L) 10/23/2019    AST 15 01/09/2018     Lab Results   Component Value Date    ALT 17 09/08/2021    ALT 19 10/23/2019    ALT 19 01/09/2018       Current Outpatient Medications   Medication Sig Dispense Refill    OMEPRAZOLE 40 MG Oral Capsule Delayed Release TAKE 1 CAPSULE(40 MG) BY MOUTH EVERY MORNING 90 capsule  0    acetaminophen-codeine (TYLENOL WITH CODEINE #3) 300-30 MG Oral Tab Take 1 tablet by mouth every 4 (four) hours as needed for Pain. 20 tablet 1    diazePAM 5 MG Oral Tab Take 1 tablet by mouth 1 hour prior to procedure, bring rest to procedure. 3 tablet 0    ipratropium 0.06 % Nasal Solution 2 sprays by Nasal route in the morning and 2 sprays before bedtime. 1 each 5    lansoprazole 30 MG Oral Capsule Delayed Release Take 1 capsule (30 mg total) by mouth before breakfast. (Patient not taking: Reported on 9/13/2023)      traMADol 50 MG Oral Tab Take 1 tablet (50 mg total) by mouth every 8 (eight) hours as needed for Pain. 21 tablet 0    TRAMADOL 50 MG Oral Tab TAKE 1 TABLET(50 MG) BY MOUTH EVERY 8 HOURS AS NEEDED FOR PAIN (Patient not taking: Reported on 9/13/2023) 21 tablet 0    cyclobenzaprine 10 MG Oral Tab Take 1 tablet (10 mg total) by mouth 3 (three) times daily as needed for Muscle spasms. 21 tablet 3    TRULICITY 0.75 MG/0.5ML Subcutaneous Solution Pen-injector Inject 0.75 mg into the skin every 7 days.      Lisdexamfetamine Dimesylate (VYVANSE) 60 MG Oral Cap Take 1 capsule (60 mg total) by mouth every morning. 30 capsule 0    ONDANSETRON 4 MG Oral Tablet Dispersible DISSOLVE 1 TABLET(4 MG) ON THE TONGUE EVERY 4 HOURS AS NEEDED FOR NAUSEA 20 tablet 0      Past Medical History:    Abdominal hernia    Anxiety    Depression    Esophageal reflux    Hiatal hernia    High cholesterol    History of depression    Indigestion    EMILIANO (obstructive sleep apnea)    AHI 7 Supine AHI 7 Non-supine AHI 7 O2 Matheus 88%    Sleep apnea    CPAP    Vomiting    Wears glasses      Past Surgical History:   Procedure Laterality Date    Breast biopsy Right     bengin    Max localization wire 1 site right (cpt=19281) Right     2x cysts removed about 20 years ago    Other surgical history      nasal septum      Family History   Problem Relation Age of Onset    Diabetes Father     Diabetes Sister     Cancer Other       Social History:    Social History     Socioeconomic History    Marital status: Single   Tobacco Use    Smoking status: Never    Smokeless tobacco: Never    Tobacco comments:     Non-smoker   Vaping Use    Vaping status: Never Used   Substance and Sexual Activity    Alcohol use: No     Alcohol/week: 0.0 standard drinks of alcohol    Drug use: No   Other Topics Concern    Caffeine Concern Yes     Comment: 2 drinks/day    Weight Concern Yes    Special Diet No    Exercise Yes     Comment: 3 times/week    Seat Belt Yes     Social Drivers of Health     Food Insecurity: Low Risk  (11/18/2021)    Received from [facility]    Food Insecurity     Have there been times that your food ran out, and you didn't have money to get more?: No     Are there times that you worry that this might happen?: No   Transportation Needs: Low Risk  (11/18/2021)    Received from [facility]    Transportation Needs     Do you have trouble getting transportation to medical appointments?: No    [facility]    Social Connections   Housing Stability: Low Risk  (11/18/2021)    Received from [facility]    Housing Stability     Are you concerned about having a safe and reliable place to live?: No     Occ: family buisness. : no. Children: no.   Exercise: none.  Diet: low carb diet     REVIEW OF SYSTEMS:   GENERAL: feels well otherwise  SKIN: denies any unusual skin lesions  EYES:denies blurred vision or double vision  HEENT: denies nasal congestion, sinus pain or ST  LUNGS: denies shortness of breath with exertion  CARDIOVASCULAR: denies chest pain on exertion  GI: denies abdominal pain,denies heartburn  : denies dysuria, vaginal discharge or itching,periods regular   MUSCULOSKELETAL: denies back pain  NEURO: denies headaches  PSYCHE:  denies depression or anxiety  HEMATOLOGIC: denies hx of anemia  ENDOCRINE: denies thyroid history  ALL/ASTHMA: denies hx of allergy or asthma    EXAM:   /72   Pulse 69   Temp 99.8 °F (37.7 °C) (Temporal)   Resp 18   Wt 153 lb 6 oz (69.6 kg)   LMP 01/16/2025   SpO2 99%   BMI 28.05 kg/m²   Body mass index is 28.05 kg/m².   GENERAL: well developed, well nourished,in no apparent distress  SKIN: no rashes,no suspicious lesions  HEENT: atraumatic, normocephalic,ears and throat are clear  EYES:PERRLA, EOMI, normal optic disk,conjunctiva are clear  NECK: supple,no adenopathy,no bruits  CHEST: no chest tenderness  BREAST: no dominant or suspicious mass  LUNGS: clear to auscultation  CARDIO: RRR without murmur  GI: good BS's,no masses, HSM or tenderness  :introitus is normal,scant discharge,cervix is pink,no adnexal masses or tenderness, PAP was done   RECTAL:good rectal tone, stool is OB negative  MUSCULOSKELETAL: back is not tender,FROM of the back  EXTREMITIES: no cyanosis, clubbing or edema  NEURO: Oriented times three,cranial nerves are intact,motor and sensory are grossly intact    ASSESSMENT AND PLAN:   Katty Weeks is a 44 year old female who presents for a complete physical exam. Order put in for mammogram and dexascan. Self breast exam explained. Health maintenance, will check fasting Lipids, CMP, and CBC. Pt referred for screening colonoscopy. Pt info handouts given for: exercise, low fat diet and breast self-exam. Pt' s weight is Body mass index is 28.05 kg/m²., recommended low fat diet and aerobic exercise 30 minutes three times weekly.  The patient indicates understanding of these issues and agrees to the plan.  The patient is asked to return for CPX in 1 year.

## (undated) DIAGNOSIS — K44.9 HIATAL HERNIA: ICD-10-CM

## (undated) DEVICE — Device: Brand: DEFENDO AIR/WATER/SUCTION AND BIOPSY VALVE

## (undated) DEVICE — 3M™ RED DOT™ MONITORING ELECTRODE WITH FOAM TAPE AND STICKY GEL, 50/BAG, 20/CASE, 72/PLT 2570: Brand: RED DOT™

## (undated) DEVICE — ENDOSCOPY PACK UPPER: Brand: MEDLINE INDUSTRIES, INC.

## (undated) DEVICE — 1200CC GUARDIAN II: Brand: GUARDIAN

## (undated) DEVICE — FILTERLINE NASAL ADULT O2/CO2

## (undated) DEVICE — FORCEP BIOPSY RJ4 LG CAP W/ND

## (undated) NOTE — LETTER
Date: 9/9/2021    Patient Name: Del Mckeon          To Whom it may concern: The above patient was seen at the [facility] for preoperative clearance for bariatric surgery .     She is an excellent candidate and there are no medica

## (undated) NOTE — LETTER
05/15/18    Patient: Precious Dominguez  : 10/6/1980 Visit date: 2018    Dear  Dr. Rand Klein MD,    Thank you for referring Precious Dominguez to my practice. Please find my assessment and plan below.                 Assessment   Gastroesophagea at some points. There is evidence of reflux during the swallow mechanism. She does not have achalasia. She does not appear to have the pattern of diffuse esophageal spasm.     At today's office visit I have recommended weight loss as 1 of the primary leni

## (undated) NOTE — LETTER
07/01/21        [address] [facility] [id number]      Dear Joyce Renee records indicate that you have outstanding lab work and or testing that was ordered for you and has not yet been completed:  Orders Placed This [place]

## (undated) NOTE — LETTER
18    Patient: Yesenia Villasenor  : 10/6/1980 Visit date: 2018    Dear  Dr. Dennie Simmers, MD,    Thank you for referring Yesenia Jacklyn to my practice. Please find my assessment and plan below.                Assessment   Gastroesophageal opportunity to discuss this with the patient as I noted in after she left my office. Despite this, I do recommend that she undergo evaluation by a specialized gastroenterologist that is expert in the technique of transesophageal hiatal hernia repair.

## (undated) NOTE — Clinical Note
[address] [facility], [address] [place] [id number]  [phone]            5/13/17      [place] Shah [address]      Dear Lethia Bosworth,  To help us provide the highest da

## (undated) NOTE — LETTER
Date: 12/2/2019    Patient Name: Dennie Rooks          To Whom it may concern: This letter has been written at the patient's request. The above patient was seen at the [facility] for treatment of upper respiratory infection.     Shannen Mcclure

## (undated) NOTE — LETTER
9/1/2021        [address] [facility] [id number]    Dear Lethia Bosworth,     Here are your results from your recent visit with Gastroenterology. You will be happy to know that your COVID 19 test returned NEGATIVE.     If you need